# Patient Record
Sex: MALE | Race: WHITE | Employment: OTHER | URBAN - METROPOLITAN AREA
[De-identification: names, ages, dates, MRNs, and addresses within clinical notes are randomized per-mention and may not be internally consistent; named-entity substitution may affect disease eponyms.]

---

## 2017-04-19 RX ORDER — POTASSIUM CHLORIDE 20 MEQ/1
20 TABLET, EXTENDED RELEASE ORAL DAILY
Qty: 30 TABLET | Refills: 6 | Status: SHIPPED | OUTPATIENT
Start: 2017-04-19

## 2024-02-25 ENCOUNTER — HOSPITAL ENCOUNTER (EMERGENCY)
Age: 69
Discharge: HOME | End: 2024-02-25
Attending: EMERGENCY MEDICINE
Payer: COMMERCIAL

## 2024-02-25 ENCOUNTER — APPOINTMENT (OUTPATIENT)
Dept: CT IMAGING | Age: 69
End: 2024-02-25
Payer: COMMERCIAL

## 2024-02-25 VITALS
BODY MASS INDEX: 29.73 KG/M2 | HEIGHT: 66 IN | OXYGEN SATURATION: 99 % | RESPIRATION RATE: 17 BRPM | SYSTOLIC BLOOD PRESSURE: 138 MMHG | HEART RATE: 66 BPM | DIASTOLIC BLOOD PRESSURE: 71 MMHG | WEIGHT: 185 LBS

## 2024-02-25 DIAGNOSIS — G44.039 NONINTRACTABLE PAROXYSMAL HEMICRANIA, UNSPECIFIED CHRONICITY PATTERN: Primary | ICD-10-CM

## 2024-02-25 PROCEDURE — 99284 EMERGENCY DEPT VISIT MOD MDM: CPT | Performed by: EMERGENCY MEDICINE

## 2024-02-25 PROCEDURE — 70450 CT HEAD/BRAIN W/O DYE: CPT

## 2024-02-25 RX ORDER — LOSARTAN POTASSIUM 50 MG/1
50 TABLET ORAL DAILY
COMMUNITY
Start: 2017-05-15

## 2024-02-25 RX ORDER — SPIRONOLACTONE 100 MG/1
100 TABLET, FILM COATED ORAL DAILY
COMMUNITY
Start: 2023-10-16

## 2024-02-25 RX ORDER — EZETIMIBE 10 MG/1
10 TABLET ORAL DAILY
COMMUNITY

## 2024-02-25 RX ORDER — OMEPRAZOLE 20 MG/1
20 TABLET, DELAYED RELEASE ORAL
COMMUNITY
Start: 2022-09-07 | End: 2024-02-25

## 2024-02-25 RX ORDER — CLINDAMYCIN HYDROCHLORIDE 300 MG/1
300 CAPSULE ORAL 3 TIMES DAILY
Qty: 21 CAPSULE | Refills: 0 | Status: SHIPPED | OUTPATIENT
Start: 2024-02-25 | End: 2024-03-03

## 2024-02-25 ASSESSMENT — PAIN - FUNCTIONAL ASSESSMENT: PAIN_FUNCTIONAL_ASSESSMENT: 0-10

## 2024-02-25 NOTE — DISCHARGE INSTRUCTIONS
return to the ER immediately.     Tell us how we did during your visit at [email or web address]   and let us know about your experience

## 2024-02-25 NOTE — ED PROVIDER NOTES
acute abnormality.    SOFT TISSUES/SKULL:  No acute abnormality of the visualized skull or soft  tissues.                    EMERGENCY DEPARTMENT COURSE:   Vitals:    Vitals:    02/25/24 0930   BP: 138/71   Pulse: 66   Resp: 17   SpO2: 99%   Weight: 83.9 kg (185 lb)   Height: 1.676 m (5' 6\")     -------------------------  BP: 138/71,  , Pulse: 66, Respirations: 17      Re-evaluation Notes    The patient is advised to see his PCP and dentist to follow-up.  He is discharged in good condition.    FINAL IMPRESSION      1. Nonintractable paroxysmal hemicrania, unspecified chronicity pattern          DISPOSITION/PLAN   DISPOSITION Decision To Discharge 02/25/2024 10:02:43 AM      Condition on Disposition    good    PATIENT REFERRED TO:  Charo Galvan APRN - CNP  [address] [address]  [phone]    In 3 days        DISCHARGE MEDICATIONS:  New Prescriptions    CLINDAMYCIN (CLEOCIN) 300 MG CAPSULE    Take 1 capsule by mouth 3 times daily for 7 days       (Please note that portions of this note were completed with a voice recognition program.  Efforts were made to edit the dictations but occasionally words are mis-transcribed.)    TARIK LAZAR MD,, MD   Attending Emergency Physician         Tarik Lazar MD  02/25/24 1007

## 2024-07-08 ENCOUNTER — HOSPITAL ENCOUNTER (OUTPATIENT)
Age: 69
Setting detail: OBSERVATION
Discharge: HOME | End: 2024-07-09
Attending: EMERGENCY MEDICINE | Admitting: FAMILY MEDICINE
Payer: MEDICARE

## 2024-07-08 ENCOUNTER — APPOINTMENT (OUTPATIENT)
Dept: GENERAL RADIOLOGY | Age: 69
End: 2024-07-08
Payer: MEDICARE

## 2024-07-08 ENCOUNTER — APPOINTMENT (OUTPATIENT)
Dept: CT IMAGING | Age: 69
End: 2024-07-08
Payer: MEDICARE

## 2024-07-08 DIAGNOSIS — R00.1 BRADYCARDIA: ICD-10-CM

## 2024-07-08 DIAGNOSIS — R55 VASOVAGAL SYNCOPE: ICD-10-CM

## 2024-07-08 DIAGNOSIS — R55 NEAR SYNCOPE: Primary | ICD-10-CM

## 2024-07-08 DIAGNOSIS — R06.02 SHORTNESS OF BREATH: ICD-10-CM

## 2024-07-08 PROBLEM — E03.9 HYPOTHYROIDISM: Status: ACTIVE | Noted: 2024-07-08

## 2024-07-08 PROBLEM — I11.0 HYPERTENSIVE HEART DISEASE WITH CHRONIC DIASTOLIC CONGESTIVE HEART FAILURE (HCC): Status: ACTIVE | Noted: 2024-07-08

## 2024-07-08 PROBLEM — I50.32 HYPERTENSIVE HEART DISEASE WITH CHRONIC DIASTOLIC CONGESTIVE HEART FAILURE (HCC): Status: ACTIVE | Noted: 2024-07-08

## 2024-07-08 PROBLEM — G25.81 RESTLESS LEG SYNDROME: Status: ACTIVE | Noted: 2024-07-08

## 2024-07-08 LAB
ALBUMIN SERPL-MCNC: 4.3 G/DL (ref 3.5–5.2)
ALBUMIN/GLOB SERPL: 1.5 {RATIO} (ref 1–2.5)
ALP SERPL-CCNC: 56 U/L (ref 40–129)
ALT SERPL-CCNC: 31 U/L (ref 5–41)
ANION GAP SERPL CALCULATED.3IONS-SCNC: 10 MMOL/L (ref 9–17)
AST SERPL-CCNC: 31 U/L
BASOPHILS # BLD: 0 K/UL (ref 0–0.2)
BASOPHILS NFR BLD: 1 % (ref 0–2)
BILIRUB SERPL-MCNC: 0.5 MG/DL (ref 0.3–1.2)
BUN SERPL-MCNC: 14 MG/DL (ref 8–23)
CALCIUM SERPL-MCNC: 9.7 MG/DL (ref 8.6–10.4)
CHLORIDE SERPL-SCNC: 105 MMOL/L (ref 98–107)
CO2 SERPL-SCNC: 23 MMOL/L (ref 20–31)
CREAT SERPL-MCNC: 0.8 MG/DL (ref 0.7–1.2)
EOSINOPHIL # BLD: 0.3 K/UL (ref 0–0.4)
EOSINOPHILS RELATIVE PERCENT: 4 % (ref 1–4)
ERYTHROCYTE [DISTWIDTH] IN BLOOD BY AUTOMATED COUNT: 14 % (ref 12.5–15.4)
GFR, ESTIMATED: >90 ML/MIN/1.73M2
GLUCOSE SERPL-MCNC: 94 MG/DL (ref 70–99)
HCT VFR BLD AUTO: 43.7 % (ref 41–53)
HGB BLD-MCNC: 15.1 G/DL (ref 13.5–17.5)
INR PPP: 1
LYMPHOCYTES NFR BLD: 1.7 K/UL (ref 1–4.8)
LYMPHOCYTES RELATIVE PERCENT: 24 % (ref 24–44)
MCH RBC QN AUTO: 32 PG (ref 26–34)
MCHC RBC AUTO-ENTMCNC: 34.6 G/DL (ref 31–37)
MCV RBC AUTO: 92.3 FL (ref 80–100)
MONOCYTES NFR BLD: 0.5 K/UL (ref 0.1–1.2)
MONOCYTES NFR BLD: 7 % (ref 2–11)
NEUTROPHILS NFR BLD: 64 % (ref 36–66)
NEUTS SEG NFR BLD: 4.7 K/UL (ref 1.8–7.7)
PARTIAL THROMBOPLASTIN TIME: 26.1 SEC (ref 21.3–31.3)
PLATELET # BLD AUTO: 189 K/UL (ref 140–450)
PMV BLD AUTO: 8.9 FL (ref 6–12)
POTASSIUM SERPL-SCNC: 4.5 MMOL/L (ref 3.7–5.3)
PROT SERPL-MCNC: 7.2 G/DL (ref 6.4–8.3)
PROTHROMBIN TIME: 10.5 SEC (ref 9.4–12.6)
RBC # BLD AUTO: 4.73 M/UL (ref 4.5–5.9)
SODIUM SERPL-SCNC: 138 MMOL/L (ref 135–144)
TROPONIN I SERPL HS-MCNC: 11 NG/L (ref 0–22)
WBC OTHER # BLD: 7.2 K/UL (ref 3.5–11)

## 2024-07-08 PROCEDURE — 2580000003 HC RX 258

## 2024-07-08 PROCEDURE — 85610 PROTHROMBIN TIME: CPT

## 2024-07-08 PROCEDURE — 6370000000 HC RX 637 (ALT 250 FOR IP)

## 2024-07-08 PROCEDURE — 96360 HYDRATION IV INFUSION INIT: CPT

## 2024-07-08 PROCEDURE — 2580000003 HC RX 258: Performed by: EMERGENCY MEDICINE

## 2024-07-08 PROCEDURE — G0378 HOSPITAL OBSERVATION PER HR: HCPCS

## 2024-07-08 PROCEDURE — 80053 COMPREHEN METABOLIC PANEL: CPT

## 2024-07-08 PROCEDURE — 84484 ASSAY OF TROPONIN QUANT: CPT

## 2024-07-08 PROCEDURE — 99285 EMERGENCY DEPT VISIT HI MDM: CPT

## 2024-07-08 PROCEDURE — 85025 COMPLETE CBC W/AUTO DIFF WBC: CPT

## 2024-07-08 PROCEDURE — 70450 CT HEAD/BRAIN W/O DYE: CPT

## 2024-07-08 PROCEDURE — 93005 ELECTROCARDIOGRAM TRACING: CPT | Performed by: EMERGENCY MEDICINE

## 2024-07-08 PROCEDURE — 85730 THROMBOPLASTIN TIME PARTIAL: CPT

## 2024-07-08 PROCEDURE — 71045 X-RAY EXAM CHEST 1 VIEW: CPT

## 2024-07-08 RX ORDER — 0.9 % SODIUM CHLORIDE 0.9 %
1000 INTRAVENOUS SOLUTION INTRAVENOUS ONCE
Status: COMPLETED | OUTPATIENT
Start: 2024-07-08 | End: 2024-07-08

## 2024-07-08 RX ORDER — POTASSIUM CHLORIDE 7.45 MG/ML
10 INJECTION INTRAVENOUS PRN
Status: DISCONTINUED | OUTPATIENT
Start: 2024-07-08 | End: 2024-07-09 | Stop reason: HOSPADM

## 2024-07-08 RX ORDER — EZETIMIBE 10 MG/1
10 TABLET ORAL DAILY
Status: DISCONTINUED | OUTPATIENT
Start: 2024-07-09 | End: 2024-07-09 | Stop reason: HOSPADM

## 2024-07-08 RX ORDER — SPIRONOLACTONE 100 MG/1
100 TABLET, FILM COATED ORAL DAILY
Status: DISCONTINUED | OUTPATIENT
Start: 2024-07-09 | End: 2024-07-09

## 2024-07-08 RX ORDER — LEVOTHYROXINE SODIUM 0.07 MG/1
150 TABLET ORAL DAILY
Status: DISCONTINUED | OUTPATIENT
Start: 2024-07-09 | End: 2024-07-09 | Stop reason: HOSPADM

## 2024-07-08 RX ORDER — ACETAMINOPHEN 325 MG/1
650 TABLET ORAL EVERY 6 HOURS PRN
Status: DISCONTINUED | OUTPATIENT
Start: 2024-07-08 | End: 2024-07-09 | Stop reason: HOSPADM

## 2024-07-08 RX ORDER — ONDANSETRON 2 MG/ML
4 INJECTION INTRAMUSCULAR; INTRAVENOUS EVERY 6 HOURS PRN
Status: DISCONTINUED | OUTPATIENT
Start: 2024-07-08 | End: 2024-07-09 | Stop reason: HOSPADM

## 2024-07-08 RX ORDER — ONDANSETRON 4 MG/1
4 TABLET, ORALLY DISINTEGRATING ORAL EVERY 8 HOURS PRN
Status: DISCONTINUED | OUTPATIENT
Start: 2024-07-08 | End: 2024-07-09 | Stop reason: HOSPADM

## 2024-07-08 RX ORDER — POTASSIUM CHLORIDE 20 MEQ/1
40 TABLET, EXTENDED RELEASE ORAL PRN
Status: DISCONTINUED | OUTPATIENT
Start: 2024-07-08 | End: 2024-07-09 | Stop reason: HOSPADM

## 2024-07-08 RX ORDER — SODIUM CHLORIDE 0.9 % (FLUSH) 0.9 %
5-40 SYRINGE (ML) INJECTION PRN
Status: DISCONTINUED | OUTPATIENT
Start: 2024-07-08 | End: 2024-07-09 | Stop reason: HOSPADM

## 2024-07-08 RX ORDER — SODIUM CHLORIDE 0.9 % (FLUSH) 0.9 %
5-40 SYRINGE (ML) INJECTION EVERY 12 HOURS SCHEDULED
Status: DISCONTINUED | OUTPATIENT
Start: 2024-07-08 | End: 2024-07-09 | Stop reason: HOSPADM

## 2024-07-08 RX ORDER — MAGNESIUM SULFATE IN WATER 40 MG/ML
2000 INJECTION, SOLUTION INTRAVENOUS PRN
Status: DISCONTINUED | OUTPATIENT
Start: 2024-07-08 | End: 2024-07-09 | Stop reason: HOSPADM

## 2024-07-08 RX ORDER — ACETAMINOPHEN 650 MG/1
650 SUPPOSITORY RECTAL EVERY 6 HOURS PRN
Status: DISCONTINUED | OUTPATIENT
Start: 2024-07-08 | End: 2024-07-09 | Stop reason: HOSPADM

## 2024-07-08 RX ORDER — PRAMIPEXOLE DIHYDROCHLORIDE 0.25 MG/1
1 TABLET ORAL NIGHTLY
Status: DISCONTINUED | OUTPATIENT
Start: 2024-07-08 | End: 2024-07-09 | Stop reason: HOSPADM

## 2024-07-08 RX ORDER — SODIUM CHLORIDE 9 MG/ML
INJECTION, SOLUTION INTRAVENOUS PRN
Status: DISCONTINUED | OUTPATIENT
Start: 2024-07-08 | End: 2024-07-09 | Stop reason: HOSPADM

## 2024-07-08 RX ORDER — SODIUM CHLORIDE 0.9 % (FLUSH) 0.9 %
3 SYRINGE (ML) INJECTION EVERY 8 HOURS
Status: DISCONTINUED | OUTPATIENT
Start: 2024-07-08 | End: 2024-07-09 | Stop reason: HOSPADM

## 2024-07-08 RX ORDER — LOSARTAN POTASSIUM 50 MG/1
50 TABLET ORAL DAILY
Status: DISCONTINUED | OUTPATIENT
Start: 2024-07-09 | End: 2024-07-09 | Stop reason: HOSPADM

## 2024-07-08 RX ORDER — POLYETHYLENE GLYCOL 3350 17 G/17G
17 POWDER, FOR SOLUTION ORAL DAILY PRN
Status: DISCONTINUED | OUTPATIENT
Start: 2024-07-08 | End: 2024-07-09 | Stop reason: HOSPADM

## 2024-07-08 RX ORDER — PRAMIPEXOLE DIHYDROCHLORIDE 0.25 MG/1
1 TABLET ORAL NIGHTLY
Status: DISCONTINUED | OUTPATIENT
Start: 2024-07-09 | End: 2024-07-08

## 2024-07-08 RX ADMIN — SODIUM CHLORIDE, PRESERVATIVE FREE 10 ML: 5 INJECTION INTRAVENOUS at 20:29

## 2024-07-08 RX ADMIN — SODIUM CHLORIDE 1000 ML: 9 INJECTION, SOLUTION INTRAVENOUS at 11:23

## 2024-07-08 RX ADMIN — PRAMIPEXOLE DIHYDROCHLORIDE 1 MG: 0.25 TABLET ORAL at 20:27

## 2024-07-08 ASSESSMENT — ENCOUNTER SYMPTOMS
SORE THROAT: 0
VOMITING: 0
COUGH: 0
CONSTIPATION: 0
SHORTNESS OF BREATH: 0
EYE DISCHARGE: 0
BACK PAIN: 0
COLOR CHANGE: 0
NAUSEA: 1
DIARRHEA: 0

## 2024-07-08 ASSESSMENT — PAIN - FUNCTIONAL ASSESSMENT: PAIN_FUNCTIONAL_ASSESSMENT: NONE - DENIES PAIN

## 2024-07-08 NOTE — H&P
[facility]  Inpatient Service     HISTORY AND PHYSICAL EXAMINATION            Date:   7/8/2024  Patient name:  Fransisco Huddleston  Date of admission:  7/8/2024 10:29 AM  MRN:   [id number]  Account:  [id number]  YOB: 1955  PCP:    Ritesh Brush MD  Room:   [address] [place] [address]  Code Status:    Full Code  hPOA Name:   Fanny Huddleston (spouse); [phone]    History Obtained From:     patient, spouse    History of Present Illness:     Fransisco Huddleston is a 68 y.o. male with PMHx of Afib, aortic regurgitation, mitral regurgitation, aortic valve prosthetic valve replacement( Aug 2015), and mitral valve surgery (Aug 2015) who presents with Dizziness (X2 weeks)  and is admitted to the hospital for the management of Syncope and collapse.      Patient has feelings of fainting and dizziness(lightheadedness) began about 2.5 weeks ago. About 2.5 weeks ago, he was eating something that got stuck in his throat and he experienced a  syncopal episode. Also, he felt like fainting when bearing down to have a bowel movement. He also endorsed feeling lightheaded when he gets up from bed and when he bends down (dizziness is worse when he bends down and looks down than when he looks down and looks straight ahead). Since then, he has avoided those triggers. He endorsed using the stair railing and walking imbalance \"like he is slightly drunk\". Recently, he came back from a 10 day trip from [place] on Friday. This morning the feelings of fainting were worse when he was walking with in the gym around with a friend around 8:20 am in the morning.  Thus, he was brought to the emergency room by his wife. He endorsed nausea and poor asleep (when he doesn't take pramipexole).      He denied palpitations, chest pain, pleuritic chest pain, orthopnea, decreased fluid/food intake, seizures, and recent hospitalizations.  He sees cardiologist Dr. nEnis.    In the ED, they ordered an initial EKG

## 2024-07-08 NOTE — ED PROVIDER NOTES
Medications    ASPIRIN 81 MG TABLET    Take 1 tablet by mouth daily    EZETIMIBE (ZETIA) 10 MG TABLET    Take 1 tablet by mouth daily    FUROSEMIDE (LASIX) 40 MG TABLET    Take 1 tablet by mouth daily    LEVOTHYROXINE (SYNTHROID) 150 MCG TABLET    Take 1 tablet by mouth Daily    LOSARTAN (COZAAR) 50 MG TABLET    Take 1 tablet by mouth daily    METOPROLOL TARTRATE (LOPRESSOR) 25 MG TABLET    Take 1 tablet by mouth 2 times daily Take 0.5 tabs bid    PRAMIPEXOLE (MIRAPEX) 1 MG TABLET    Take 1 tablet by mouth daily For restless leg syndrome    SPIRONOLACTONE (ALDACTONE) 100 MG TABLET    Take 1 tablet by mouth daily       ALLERGIES     is allergic to penicillins.    FAMILY HISTORY     He indicated that his mother is . He indicated that his father is alive. He indicated that only one of his two sisters is alive. He indicated that only one of his two brothers is alive.     family history includes Other in his brother, father, and mother; Thyroid Disease in his sister.    SOCIAL HISTORY      reports that he has been smoking pipe and cigars. He has never used smokeless tobacco. He reports current alcohol use of about 2.0 standard drinks of alcohol per week. He reports that he does not use drugs.    PHYSICAL EXAM     INITIAL VITALS:  height is 1.702 m (5' 7\") and weight is 85.3 kg (188 lb). His blood pressure is 125/75 and his pulse is 55. His respiration is 15 and oxygen saturation is 99%.   Physical Exam  Vitals reviewed.   Constitutional:       Appearance: He is well-developed.   HENT:      Head: Normocephalic and atraumatic.   Eyes:      Conjunctiva/sclera: Conjunctivae normal.      Pupils: Pupils are equal, round, and reactive to light.   Neck:      Trachea: No tracheal deviation.   Cardiovascular:      Rate and Rhythm: Normal rate and regular rhythm.   Pulmonary:      Effort: Pulmonary effort is normal.      Breath sounds: Normal breath sounds.   Abdominal:      General: Bowel sounds are normal. There is no

## 2024-07-09 ENCOUNTER — APPOINTMENT (OUTPATIENT)
Age: 69
End: 2024-07-09
Payer: MEDICARE

## 2024-07-09 VITALS
DIASTOLIC BLOOD PRESSURE: 80 MMHG | SYSTOLIC BLOOD PRESSURE: 105 MMHG | HEIGHT: 67 IN | TEMPERATURE: 97.7 F | WEIGHT: 188 LBS | OXYGEN SATURATION: 96 % | HEART RATE: 69 BPM | RESPIRATION RATE: 17 BRPM | BODY MASS INDEX: 29.51 KG/M2

## 2024-07-09 PROBLEM — Z95.2 STATUS POST AORTIC VALVE REPLACEMENT: Status: ACTIVE | Noted: 2024-07-09

## 2024-07-09 PROBLEM — Z98.890 S/P MVR (MITRAL VALVE REPAIR): Status: ACTIVE | Noted: 2024-07-09

## 2024-07-09 LAB
ANION GAP SERPL CALCULATED.3IONS-SCNC: 9 MMOL/L (ref 9–17)
BASOPHILS # BLD: 0.1 K/UL (ref 0–0.2)
BASOPHILS NFR BLD: 1 % (ref 0–2)
BUN SERPL-MCNC: 13 MG/DL (ref 8–23)
CALCIUM SERPL-MCNC: 9.3 MG/DL (ref 8.6–10.4)
CHLORIDE SERPL-SCNC: 105 MMOL/L (ref 98–107)
CO2 SERPL-SCNC: 25 MMOL/L (ref 20–31)
CREAT SERPL-MCNC: 0.8 MG/DL (ref 0.7–1.2)
ECHO AV AREA PEAK VELOCITY: 1.2 CM2
ECHO AV AREA VTI: 1.2 CM2
ECHO AV AREA/BSA PEAK VELOCITY: 0.6 CM2/M2
ECHO AV AREA/BSA VTI: 0.6 CM2/M2
ECHO AV MEAN GRADIENT: 23 MMHG
ECHO AV MEAN VELOCITY: 2.3 M/S
ECHO AV PEAK GRADIENT: 41 MMHG
ECHO AV PEAK VELOCITY: 3.2 M/S
ECHO AV VELOCITY RATIO: 0.34
ECHO AV VTI: 73.9 CM
ECHO BSA: 2.01 M2
ECHO LV FRACTIONAL SHORTENING: 37 % (ref 28–44)
ECHO LV INTERNAL DIMENSION DIASTOLE INDEX: 2.49 CM/M2
ECHO LV INTERNAL DIMENSION DIASTOLIC: 4.9 CM (ref 4.2–5.9)
ECHO LV INTERNAL DIMENSION SYSTOLIC INDEX: 1.57 CM/M2
ECHO LV INTERNAL DIMENSION SYSTOLIC: 3.1 CM
ECHO LV IVSD: 1 CM (ref 0.6–1)
ECHO LV MASS 2D: 176 G (ref 88–224)
ECHO LV MASS INDEX 2D: 89.4 G/M2 (ref 49–115)
ECHO LV POSTERIOR WALL DIASTOLIC: 1 CM (ref 0.6–1)
ECHO LV RELATIVE WALL THICKNESS RATIO: 0.41
ECHO LVOT AREA: 3.5 CM2
ECHO LVOT AV VTI INDEX: 0.35
ECHO LVOT DIAM: 2.1 CM
ECHO LVOT MEAN GRADIENT: 3 MMHG
ECHO LVOT PEAK GRADIENT: 4 MMHG
ECHO LVOT PEAK VELOCITY: 1.1 M/S
ECHO LVOT STROKE VOLUME INDEX: 45.2 ML/M2
ECHO LVOT SV: 89 ML
ECHO LVOT VTI: 25.7 CM
ECHO MV AREA VTI: 1.7 CM2
ECHO MV LVOT VTI INDEX: 1.98
ECHO MV MAX VELOCITY: 1.7 M/S
ECHO MV MEAN GRADIENT: 7 MMHG
ECHO MV MEAN VELOCITY: 1.2 M/S
ECHO MV PEAK GRADIENT: 12 MMHG
ECHO MV VTI: 51 CM
EKG ATRIAL RATE: 54 BPM
EKG P AXIS: 17 DEGREES
EKG P-R INTERVAL: 320 MS
EKG Q-T INTERVAL: 432 MS
EKG QRS DURATION: 94 MS
EKG QTC CALCULATION (BAZETT): 409 MS
EKG R AXIS: -16 DEGREES
EKG T AXIS: 33 DEGREES
EKG VENTRICULAR RATE: 54 BPM
EOSINOPHIL # BLD: 0.3 K/UL (ref 0–0.4)
EOSINOPHILS RELATIVE PERCENT: 4 % (ref 1–4)
ERYTHROCYTE [DISTWIDTH] IN BLOOD BY AUTOMATED COUNT: 14 % (ref 12.5–15.4)
GFR, ESTIMATED: >90 ML/MIN/1.73M2
GLUCOSE SERPL-MCNC: 92 MG/DL (ref 70–99)
HCT VFR BLD AUTO: 42.5 % (ref 41–53)
HGB BLD-MCNC: 14.9 G/DL (ref 13.5–17.5)
LYMPHOCYTES NFR BLD: 1.9 K/UL (ref 1–4.8)
LYMPHOCYTES RELATIVE PERCENT: 25 % (ref 24–44)
MCH RBC QN AUTO: 32.3 PG (ref 26–34)
MCHC RBC AUTO-ENTMCNC: 35 G/DL (ref 31–37)
MCV RBC AUTO: 92.1 FL (ref 80–100)
MONOCYTES NFR BLD: 0.5 K/UL (ref 0.1–1.2)
MONOCYTES NFR BLD: 6 % (ref 2–11)
NEUTROPHILS NFR BLD: 64 % (ref 36–66)
NEUTS SEG NFR BLD: 4.8 K/UL (ref 1.8–7.7)
PLATELET # BLD AUTO: 197 K/UL (ref 140–450)
PMV BLD AUTO: 9.1 FL (ref 6–12)
POTASSIUM SERPL-SCNC: 4.4 MMOL/L (ref 3.7–5.3)
RBC # BLD AUTO: 4.61 M/UL (ref 4.5–5.9)
SODIUM SERPL-SCNC: 139 MMOL/L (ref 135–144)
WBC OTHER # BLD: 7.4 K/UL (ref 3.5–11)

## 2024-07-09 PROCEDURE — 6370000000 HC RX 637 (ALT 250 FOR IP)

## 2024-07-09 PROCEDURE — G0378 HOSPITAL OBSERVATION PER HR: HCPCS

## 2024-07-09 PROCEDURE — 99223 1ST HOSP IP/OBS HIGH 75: CPT | Performed by: NURSE PRACTITIONER

## 2024-07-09 PROCEDURE — 93308 TTE F-UP OR LMTD: CPT | Performed by: INTERNAL MEDICINE

## 2024-07-09 PROCEDURE — 93325 DOPPLER ECHO COLOR FLOW MAPG: CPT | Performed by: INTERNAL MEDICINE

## 2024-07-09 PROCEDURE — 99222 1ST HOSP IP/OBS MODERATE 55: CPT | Performed by: FAMILY MEDICINE

## 2024-07-09 PROCEDURE — 2580000003 HC RX 258

## 2024-07-09 PROCEDURE — 85025 COMPLETE CBC W/AUTO DIFF WBC: CPT

## 2024-07-09 PROCEDURE — 99238 HOSP IP/OBS DSCHRG MGMT 30/<: CPT | Performed by: FAMILY MEDICINE

## 2024-07-09 PROCEDURE — 6370000000 HC RX 637 (ALT 250 FOR IP): Performed by: NURSE PRACTITIONER

## 2024-07-09 PROCEDURE — 80048 BASIC METABOLIC PNL TOTAL CA: CPT

## 2024-07-09 PROCEDURE — 36415 COLL VENOUS BLD VENIPUNCTURE: CPT

## 2024-07-09 PROCEDURE — 93321 DOPPLER ECHO F-UP/LMTD STD: CPT | Performed by: INTERNAL MEDICINE

## 2024-07-09 PROCEDURE — 93308 TTE F-UP OR LMTD: CPT

## 2024-07-09 RX ORDER — SPIRONOLACTONE 25 MG/1
25 TABLET ORAL DAILY
Status: DISCONTINUED | OUTPATIENT
Start: 2024-07-09 | End: 2024-07-09 | Stop reason: HOSPADM

## 2024-07-09 RX ORDER — LOSARTAN POTASSIUM 50 MG/1
25 TABLET ORAL DAILY
Qty: 30 TABLET | Refills: 0 | Status: SHIPPED | OUTPATIENT
Start: 2024-07-09

## 2024-07-09 RX ORDER — SPIRONOLACTONE 25 MG/1
25 TABLET ORAL DAILY
Qty: 30 TABLET | Refills: 0 | Status: SHIPPED | OUTPATIENT
Start: 2024-07-09

## 2024-07-09 RX ORDER — SPIRONOLACTONE 25 MG/1
25 TABLET ORAL DAILY
Qty: 30 TABLET | Refills: 3 | Status: CANCELLED | OUTPATIENT
Start: 2024-07-10

## 2024-07-09 RX ADMIN — SODIUM CHLORIDE, PRESERVATIVE FREE 10 ML: 5 INJECTION INTRAVENOUS at 10:57

## 2024-07-09 RX ADMIN — LOSARTAN POTASSIUM 50 MG: 50 TABLET, FILM COATED ORAL at 09:28

## 2024-07-09 RX ADMIN — EZETIMIBE 10 MG: 10 TABLET ORAL at 09:28

## 2024-07-09 RX ADMIN — LEVOTHYROXINE SODIUM 150 MCG: 75 TABLET ORAL at 06:59

## 2024-07-09 RX ADMIN — SPIRONOLACTONE 25 MG: 25 TABLET, FILM COATED ORAL at 09:28

## 2024-07-09 NOTE — PLAN OF CARE
Prelim ECHO reviewed with preserved EF and stable valvular dx.   Negative orthostatics.     Ok to d/c home off BB.    Plan for outpt follow up in clinic and will consider holter then

## 2024-07-09 NOTE — DISCHARGE INSTRUCTIONS
Date of admission: 7/8/2024  Date of discharge: 07/09/24    Your care was provided by the attending and resident physicians of the [facility] Residency Program. The primary encounter diagnosis was Near syncope. Diagnoses of Bradycardia, Shortness of breath, and Vasovagal syncope were also pertinent to this visit.    FOLLOW-UP  - Follow up with your primary care physician Ritesh Brush in 2 days.  - Follow up with your cardiology in 1  weeks.    MEDICATIONS  -   We have decreased your spironolactone dose. Please continue taking your spironolactone 25 mg tablet until you see your primary care doctor.  - Continue taking your other home medications as directed.    ADDITIONAL INSTRUCTIONS  - you will need to follow up with cardiology for outpatient Holter monitoring or loop recorder  - Please return immediately to the [facility] Emergency Department if you experience fainting, palpitations, chest pain, shortness of breath and any other concerning symptoms.

## 2024-07-09 NOTE — PLAN OF CARE
Problem: Discharge Planning  Goal: Discharge to home or other facility with appropriate resources  Outcome: Progressing  Flowsheets (Taken 7/8/2024 2000)  Discharge to home or other facility with appropriate resources:   Identify barriers to discharge with patient and caregiver   Arrange for needed discharge resources and transportation as appropriate   Refer to discharge planning if patient needs post-hospital services based on physician order or complex needs related to functional status, cognitive ability or social support system     Problem: ABCDS Injury Assessment  Goal: Absence of physical injury  Outcome: Progressing

## 2024-07-09 NOTE — PLAN OF CARE
Problem: Discharge Planning  Goal: Discharge to home or other facility with appropriate resources  7/9/2024 1656 by Amparo Ford, RN  Outcome: Progressing  Flowsheets (Taken 7/9/2024 0830)  Discharge to home or other facility with appropriate resources:   Identify barriers to discharge with patient and caregiver   Identify discharge learning needs (meds, wound care, etc)     Problem: ABCDS Injury Assessment  Goal: Absence of physical injury  7/9/2024 1656 by Amparo Ford, RN  Outcome: Progressing

## 2024-07-09 NOTE — PROGRESS NOTES
Pt discharged via wheelchair. Discharge instructions reviewed with pt and wife. No questions at this time. Pt AMANDA [id number]  
future visits requested       
auscultation  Pulmonary:      Effort: Pulmonary effort is normal. No tachypnea, bradypnea or accessory muscle usage.      Breath sounds: Normal breath sounds. No wheezing or rales.   Skin:     General: Skin is warm.      Coloration: Skin is not ashen, cyanotic or mottled.      Nails: There is no clubbing.      Comments: No rash or wounds upon inspection of the foot bilaterally  Hair noted in both feet upon inspection  Trace pitting edema in R leg and 0.5 pitting edema in the L leg noted     Neurological:      Mental Status: He is alert.            Assessment:     Hospital Problems             Last Modified POA    * (Principal) Syncope and collapse 7/8/2024 Yes    Moderate aortic regurgitation 7/8/2024 Yes    Severe mitral regurgitation by prior echocardiogram 7/8/2024 Yes    Moderate to severe pulmonary hypertension (HCC) 7/8/2024 Yes    Current tobacco use 7/8/2024 Yes    Paroxysmal A-fib (HCC) 7/8/2024 Yes    Restless leg syndrome 7/8/2024 Yes    Hypertensive heart disease with chronic diastolic congestive heart failure (HCC) 7/8/2024 Yes    Hypothyroidism 7/8/2024 Yes       Plan:   Syncope x 1  paroxysmal Afib  Hx of Aortic Regurgitation and Mitral Regurgitation  Hx of Aortic Valve Replacement and Mitral Valve Surgery (Aug 2015)  Cardio consulted; recommendations were appreciated  Echo pending; cardio recommended discharge with outpatient follow up if echo is stable  Repeat orthostatics was negative  Avoid AV blocking agents, per cardio  Will decrease losartan to 25 mg daily if blood pressure is low, per cardio  Most likely vasovagal; positive hx of lightheadedness after eating food and baring down to have a bowel movement  Could be due to bradycardia with or without arrhythmia; cardio recommended holter monitor assessment in outpatient setting  Cardiac Telemetry: Nonsustained VT at 8:44pm last night; Bradycardia with HR 47-59 this AM  Hx of metoprolol 25 mg; continue to hold because of bradycardia

## 2024-07-09 NOTE — CONSULTS
07/08/24  1127   PROTIME 10.5   INR 1.0     APTT:  Recent Labs     07/08/24  1127   APTT 26.1     CARDIAC ENZYMES:No results for input(s): \"CKTOTAL\", \"CKMB\", \"CKMBINDEX\", \"TROPONINI\" in the last 72 hours.  FASTING LIPID PANEL:No results found for: \"HDL\", \"LDLDIRECT\", \"TRIG\"  LIVER PROFILE:  Recent Labs     07/08/24  1043   AST 31   ALT 31       IMPRESSION:    Patient Active Problem List   Diagnosis    Moderate aortic regurgitation    Severe mitral regurgitation by prior echocardiogram    Moderate to severe pulmonary hypertension (HCC)    Atrial dilation    Pericardial effusion    Current tobacco use    Paroxysmal A-fib (HCC)    Syncope and collapse    Restless leg syndrome    Hypertensive heart disease with chronic diastolic congestive heart failure (HCC)    Hypothyroidism       RECOMMENDATIONS:  Near syncope. Negative orthostatics. Recheck today.  Likely secondary to bradycardia. Discussed hydration   Bradycardia.  Off BB. Avoid AV blocking agents   HTN. Soft will monitor.  Will decrease aldactone from 100mg to 25mg daily.  Continue losartan for now, if BP remains lower, will decrease to 25mg daily   Check ECHO   If ECHO stable, likely no further inpt workup.  Will plan for outpt holter monitor.        Discussed with patient and Nurse.  Cassie Gonzalez, CNP  [facility]           [phone]

## 2024-07-10 NOTE — DISCHARGE SUMMARY
[facility]  Inpatient Service    Discharge Summary     Patient ID: Fransisco Huddleston  :  1955   MRN: [id number]     ACCOUNT:  [id number]   Patient's PCP: Ritesh Brush MD  Admit Date: 2024   Discharge Date: 2024  Length of Stay: 1  Code Status:  Prior  Admitting Physician: Mina Bermeo MD  Discharge Physician: Mina Bermeo MD    Active Discharge Diagnoses:     Hospital Problem Lists:  Principal Problem:    Near syncope  Active Problems:    Moderate aortic regurgitation    Severe mitral regurgitation by prior echocardiogram    Moderate to severe pulmonary hypertension (HCC)    Current tobacco use    Paroxysmal A-fib (HCC)    Restless leg syndrome    Hypertensive heart disease with chronic diastolic congestive heart failure (HCC)    Hypothyroidism    Status post aortic valve replacement    S/P MVR (mitral valve repair)  Resolved Problems:    * No resolved hospital problems. *      Admission Condition:  good     Discharged Condition: good    Hospital Stay:     Hospital Course:  Fransisco Huddleston is a 68 y.o. male with a PMH of  PMHx of Afib, aortic regurgitation, mitral regurgitation, aortic valve prosthetic valve replacement( Aug 2015), and mitral valve surgery (Aug 2015) who presented to ED with the complaint of dizziness; he was admitted for syncope and collapse. Patient was feeling lightheaded and unstable for a period of 2 weeks with one syncopal event.      In the ED, they ordered an initial EKG which was positive for sinus bradycardia and first degree AV block. CBC and CMP were grossly normal. CXR was positive for mild cardiomegaly. 1L fluid bolus was given. Troponin was 11. PT, APTT, and INR were normal. Negative orthostatics. CT Head was negative for acute intracranial abnormality. Cardio was consulted in the ED and recommended admission for observation.    While hospitalized, metoprolol was held secondary to bradycardia. Losartan and

## 2025-05-29 ENCOUNTER — APPOINTMENT (OUTPATIENT)
Dept: GENERAL RADIOLOGY | Age: 70
DRG: 091 | End: 2025-05-29
Payer: MEDICARE

## 2025-05-29 ENCOUNTER — APPOINTMENT (OUTPATIENT)
Dept: CT IMAGING | Age: 70
DRG: 091 | End: 2025-05-29
Payer: MEDICARE

## 2025-05-29 ENCOUNTER — HOSPITAL ENCOUNTER (EMERGENCY)
Age: 70
Discharge: ANOTHER HOSPITAL | DRG: 091 | End: 2025-05-30
Attending: EMERGENCY MEDICINE
Payer: MEDICARE

## 2025-05-29 DIAGNOSIS — G21.0 NEUROLEPTIC MALIGNANT SYNDROME: Primary | ICD-10-CM

## 2025-05-29 LAB
ALBUMIN SERPL-MCNC: 4.5 G/DL (ref 3.5–5.2)
ALBUMIN/GLOB SERPL: 1.6 {RATIO} (ref 1–2.5)
ALLEN TEST: POSITIVE
ALP SERPL-CCNC: 83 U/L (ref 40–129)
ALT SERPL-CCNC: 69 U/L (ref 10–50)
AMPHET UR QL SCN: NEGATIVE
ANION GAP SERPL CALCULATED.3IONS-SCNC: 12 MMOL/L (ref 9–16)
APAP SERPL-MCNC: <5 UG/ML (ref 10–30)
AST SERPL-CCNC: 83 U/L (ref 10–50)
BARBITURATES UR QL SCN: NEGATIVE
BASOPHILS # BLD: 0 K/UL (ref 0–0.2)
BASOPHILS NFR BLD: 1 % (ref 0–2)
BENZODIAZ UR QL: POSITIVE
BILIRUB DIRECT SERPL-MCNC: 0.2 MG/DL (ref 0–0.2)
BILIRUB INDIRECT SERPL-MCNC: 0.1 MG/DL (ref 0–1)
BILIRUB SERPL-MCNC: 0.3 MG/DL (ref 0–1.2)
BUN SERPL-MCNC: 12 MG/DL (ref 8–23)
CALCIUM SERPL-MCNC: 9.1 MG/DL (ref 8.6–10.4)
CANNABINOIDS UR QL SCN: POSITIVE
CHLORIDE SERPL-SCNC: 105 MMOL/L (ref 98–107)
CK SERPL-CCNC: 1853 U/L (ref 39–308)
CO2 SERPL-SCNC: 24 MMOL/L (ref 20–31)
COCAINE UR QL SCN: NEGATIVE
CREAT SERPL-MCNC: 0.9 MG/DL (ref 0.7–1.2)
EOSINOPHIL # BLD: 0.5 K/UL (ref 0–0.4)
EOSINOPHILS RELATIVE PERCENT: 7 % (ref 1–4)
ERYTHROCYTE [DISTWIDTH] IN BLOOD BY AUTOMATED COUNT: 13.9 % (ref 12.5–15.4)
ETHANOL PERCENT: NORMAL %
ETHANOLAMINE SERPL-MCNC: <10 MG/DL (ref 0–0.08)
FENTANYL UR QL: NEGATIVE
FIO2: 50
GFR, ESTIMATED: >90 ML/MIN/1.73M2
GLUCOSE SERPL-MCNC: 88 MG/DL (ref 74–99)
HCT VFR BLD AUTO: 44.8 % (ref 41–53)
HGB BLD-MCNC: 15 G/DL (ref 13.5–17.5)
LYMPHOCYTES NFR BLD: 2 K/UL (ref 1–4.8)
LYMPHOCYTES RELATIVE PERCENT: 27 % (ref 24–44)
MAGNESIUM SERPL-MCNC: 2.5 MG/DL (ref 1.6–2.4)
MCH RBC QN AUTO: 30.5 PG (ref 26–34)
MCHC RBC AUTO-ENTMCNC: 33.6 G/DL (ref 31–37)
MCV RBC AUTO: 90.9 FL (ref 80–100)
METHADONE UR QL: NEGATIVE
MODE: AC
MONOCYTES NFR BLD: 0.6 K/UL (ref 0.1–1.2)
MONOCYTES NFR BLD: 9 % (ref 2–11)
NEGATIVE BASE EXCESS, ART: 0.1 MMOL/L (ref 0–2)
NEUTROPHILS NFR BLD: 56 % (ref 36–66)
NEUTS SEG NFR BLD: 4.2 K/UL (ref 1.8–7.7)
O2 DELIVERY DEVICE: ABNORMAL
OPIATES UR QL SCN: NEGATIVE
OXYCODONE UR QL SCN: NEGATIVE
PCP UR QL SCN: NEGATIVE
PLATELET # BLD AUTO: 240 K/UL (ref 140–450)
PMV BLD AUTO: 8.7 FL (ref 6–12)
POC HCO3: 25.4 MMOL/L (ref 21–28)
POC O2 SATURATION: 99.3 % (ref 94–98)
POC PCO2: 43.3 MM HG (ref 35–48)
POC PH: 7.38 (ref 7.35–7.45)
POC PO2: 152.1 MM HG (ref 83–108)
POTASSIUM SERPL-SCNC: 4.1 MMOL/L (ref 3.7–5.3)
PROT SERPL-MCNC: 7.3 G/DL (ref 6.6–8.7)
RBC # BLD AUTO: 4.93 M/UL (ref 4.5–5.9)
SALICYLATES SERPL-MCNC: <0.5 MG/DL (ref 0–10)
SAMPLE SITE: ABNORMAL
SODIUM SERPL-SCNC: 141 MMOL/L (ref 136–145)
TROPONIN I SERPL HS-MCNC: 17 NG/L (ref 0–22)
TSH SERPL DL<=0.05 MIU/L-ACNC: 1.48 UIU/ML (ref 0.27–4.2)
WBC OTHER # BLD: 7.3 K/UL (ref 3.5–11)

## 2025-05-29 PROCEDURE — 6360000002 HC RX W HCPCS

## 2025-05-29 PROCEDURE — 84484 ASSAY OF TROPONIN QUANT: CPT

## 2025-05-29 PROCEDURE — 82803 BLOOD GASES ANY COMBINATION: CPT

## 2025-05-29 PROCEDURE — 71045 X-RAY EXAM CHEST 1 VIEW: CPT

## 2025-05-29 PROCEDURE — 70450 CT HEAD/BRAIN W/O DYE: CPT

## 2025-05-29 PROCEDURE — 80143 DRUG ASSAY ACETAMINOPHEN: CPT

## 2025-05-29 PROCEDURE — 0BH17EZ INSERTION OF ENDOTRACHEAL AIRWAY INTO TRACHEA, VIA NATURAL OR ARTIFICIAL OPENING: ICD-10-PCS | Performed by: EMERGENCY MEDICINE

## 2025-05-29 PROCEDURE — 85025 COMPLETE CBC W/AUTO DIFF WBC: CPT

## 2025-05-29 PROCEDURE — 2580000003 HC RX 258: Performed by: EMERGENCY MEDICINE

## 2025-05-29 PROCEDURE — 5A1945Z RESPIRATORY VENTILATION, 24-96 CONSECUTIVE HOURS: ICD-10-PCS | Performed by: INTERNAL MEDICINE

## 2025-05-29 PROCEDURE — 80076 HEPATIC FUNCTION PANEL: CPT

## 2025-05-29 PROCEDURE — 36415 COLL VENOUS BLD VENIPUNCTURE: CPT

## 2025-05-29 PROCEDURE — 80307 DRUG TEST PRSMV CHEM ANLYZR: CPT

## 2025-05-29 PROCEDURE — 6360000002 HC RX W HCPCS: Performed by: NURSE PRACTITIONER

## 2025-05-29 PROCEDURE — 80048 BASIC METABOLIC PNL TOTAL CA: CPT

## 2025-05-29 PROCEDURE — 6360000002 HC RX W HCPCS: Performed by: EMERGENCY MEDICINE

## 2025-05-29 PROCEDURE — 2500000003 HC RX 250 WO HCPCS: Performed by: EMERGENCY MEDICINE

## 2025-05-29 PROCEDURE — 93005 ELECTROCARDIOGRAM TRACING: CPT | Performed by: NURSE PRACTITIONER

## 2025-05-29 PROCEDURE — 84443 ASSAY THYROID STIM HORMONE: CPT

## 2025-05-29 PROCEDURE — 2500000003 HC RX 250 WO HCPCS

## 2025-05-29 PROCEDURE — 80179 DRUG ASSAY SALICYLATE: CPT

## 2025-05-29 PROCEDURE — 82550 ASSAY OF CK (CPK): CPT

## 2025-05-29 PROCEDURE — 36600 WITHDRAWAL OF ARTERIAL BLOOD: CPT

## 2025-05-29 PROCEDURE — 83735 ASSAY OF MAGNESIUM: CPT

## 2025-05-29 PROCEDURE — G0480 DRUG TEST DEF 1-7 CLASSES: HCPCS

## 2025-05-29 RX ORDER — HALOPERIDOL 5 MG/ML
5 INJECTION INTRAMUSCULAR ONCE
Status: COMPLETED | OUTPATIENT
Start: 2025-05-29 | End: 2025-05-29

## 2025-05-29 RX ORDER — ROCURONIUM BROMIDE 10 MG/ML
1 INJECTION, SOLUTION INTRAVENOUS PRN
Status: DISCONTINUED | OUTPATIENT
Start: 2025-05-29 | End: 2025-05-30

## 2025-05-29 RX ORDER — LORAZEPAM 2 MG/ML
2 INJECTION INTRAMUSCULAR ONCE
Status: COMPLETED | OUTPATIENT
Start: 2025-05-29 | End: 2025-05-29

## 2025-05-29 RX ORDER — ROCURONIUM BROMIDE 10 MG/ML
100 INJECTION, SOLUTION INTRAVENOUS ONCE
Status: COMPLETED | OUTPATIENT
Start: 2025-05-29 | End: 2025-05-29

## 2025-05-29 RX ORDER — BROMOCRIPTINE MESYLATE 2.5 MG/1
2.5 TABLET ORAL ONCE
Status: DISCONTINUED | OUTPATIENT
Start: 2025-05-29 | End: 2025-05-30 | Stop reason: HOSPADM

## 2025-05-29 RX ORDER — DIPHENHYDRAMINE HYDROCHLORIDE 50 MG/ML
25 INJECTION, SOLUTION INTRAMUSCULAR; INTRAVENOUS ONCE
Status: COMPLETED | OUTPATIENT
Start: 2025-05-29 | End: 2025-05-29

## 2025-05-29 RX ORDER — DEXMEDETOMIDINE HYDROCHLORIDE 4 UG/ML
INJECTION, SOLUTION INTRAVENOUS
Status: DISCONTINUED
Start: 2025-05-29 | End: 2025-05-30 | Stop reason: HOSPADM

## 2025-05-29 RX ORDER — ROCURONIUM BROMIDE 10 MG/ML
0.6 INJECTION, SOLUTION INTRAVENOUS ONCE
Status: DISCONTINUED | OUTPATIENT
Start: 2025-05-29 | End: 2025-05-29

## 2025-05-29 RX ORDER — LORAZEPAM 2 MG/ML
1 INJECTION INTRAMUSCULAR ONCE
Status: DISCONTINUED | OUTPATIENT
Start: 2025-05-29 | End: 2025-05-29

## 2025-05-29 RX ORDER — LORAZEPAM 1 MG/1
1 TABLET ORAL ONCE
Status: DISCONTINUED | OUTPATIENT
Start: 2025-05-29 | End: 2025-05-29

## 2025-05-29 RX ORDER — ETOMIDATE 2 MG/ML
20 INJECTION INTRAVENOUS ONCE
Status: COMPLETED | OUTPATIENT
Start: 2025-05-29 | End: 2025-05-29

## 2025-05-29 RX ORDER — LORAZEPAM 2 MG/ML
8 INJECTION INTRAMUSCULAR ONCE
Status: COMPLETED | OUTPATIENT
Start: 2025-05-29 | End: 2025-05-29

## 2025-05-29 RX ORDER — LORAZEPAM 2 MG/ML
INJECTION INTRAMUSCULAR
Status: COMPLETED
Start: 2025-05-29 | End: 2025-05-29

## 2025-05-29 RX ORDER — MIDAZOLAM HYDROCHLORIDE 1 MG/ML
1-10 INJECTION, SOLUTION INTRAVENOUS CONTINUOUS
Status: DISCONTINUED | OUTPATIENT
Start: 2025-05-29 | End: 2025-05-30 | Stop reason: HOSPADM

## 2025-05-29 RX ORDER — LORAZEPAM 2 MG/ML
1 INJECTION INTRAMUSCULAR ONCE
Status: COMPLETED | OUTPATIENT
Start: 2025-05-29 | End: 2025-05-29

## 2025-05-29 RX ORDER — SODIUM CHLORIDE 9 MG/ML
INJECTION, SOLUTION INTRAVENOUS CONTINUOUS
Status: DISCONTINUED | OUTPATIENT
Start: 2025-05-29 | End: 2025-05-30 | Stop reason: HOSPADM

## 2025-05-29 RX ORDER — BENZTROPINE MESYLATE 1 MG/ML
1 INJECTION, SOLUTION INTRAMUSCULAR; INTRAVENOUS ONCE
Status: COMPLETED | OUTPATIENT
Start: 2025-05-29 | End: 2025-05-29

## 2025-05-29 RX ORDER — 0.9 % SODIUM CHLORIDE 0.9 %
1000 INTRAVENOUS SOLUTION INTRAVENOUS ONCE
Status: COMPLETED | OUTPATIENT
Start: 2025-05-29 | End: 2025-05-29

## 2025-05-29 RX ORDER — PROPOFOL 10 MG/ML
5-50 INJECTION, EMULSION INTRAVENOUS CONTINUOUS
Status: DISCONTINUED | OUTPATIENT
Start: 2025-05-29 | End: 2025-05-30 | Stop reason: HOSPADM

## 2025-05-29 RX ORDER — DEXMEDETOMIDINE HYDROCHLORIDE 4 UG/ML
.1-1.5 INJECTION, SOLUTION INTRAVENOUS CONTINUOUS
Status: DISCONTINUED | OUTPATIENT
Start: 2025-05-29 | End: 2025-05-30 | Stop reason: HOSPADM

## 2025-05-29 RX ADMIN — ETOMIDATE 20 MG: 2 INJECTION INTRAVENOUS at 22:03

## 2025-05-29 RX ADMIN — SODIUM CHLORIDE 1000 ML: 0.9 INJECTION, SOLUTION INTRAVENOUS at 22:57

## 2025-05-29 RX ADMIN — BENZTROPINE MESYLATE 1 MG: 1 INJECTION INTRAMUSCULAR; INTRAVENOUS at 21:07

## 2025-05-29 RX ADMIN — LORAZEPAM 6 MG: 2 INJECTION INTRAMUSCULAR; INTRAVENOUS at 21:40

## 2025-05-29 RX ADMIN — ROCURONIUM BROMIDE 100 MG: 10 INJECTION, SOLUTION INTRAVENOUS at 22:03

## 2025-05-29 RX ADMIN — PROPOFOL 20 MCG/KG/MIN: 10 INJECTION, EMULSION INTRAVENOUS at 22:14

## 2025-05-29 RX ADMIN — MIDAZOLAM HYDROCHLORIDE 2 MG/HR: 1 INJECTION, SOLUTION INTRAVENOUS at 22:13

## 2025-05-29 RX ADMIN — HALOPERIDOL LACTATE 5 MG: 5 INJECTION, SOLUTION INTRAMUSCULAR at 20:34

## 2025-05-29 RX ADMIN — DEXMEDETOMIDINE HYDROCHLORIDE 0.2 MCG/KG/HR: 400 INJECTION, SOLUTION INTRAVENOUS at 21:26

## 2025-05-29 RX ADMIN — DIPHENHYDRAMINE HYDROCHLORIDE 25 MG: 50 INJECTION INTRAMUSCULAR; INTRAVENOUS at 20:52

## 2025-05-29 RX ADMIN — LORAZEPAM 2 MG: 2 INJECTION INTRAMUSCULAR; INTRAVENOUS at 20:58

## 2025-05-29 RX ADMIN — LORAZEPAM 1 MG: 2 INJECTION INTRAMUSCULAR; INTRAVENOUS at 20:03

## 2025-05-29 RX ADMIN — LORAZEPAM 2 MG: 2 INJECTION INTRAMUSCULAR; INTRAVENOUS at 21:14

## 2025-05-29 RX ADMIN — LORAZEPAM 2 MG: 2 INJECTION INTRAMUSCULAR at 21:14

## 2025-05-29 RX ADMIN — SODIUM CHLORIDE: 0.9 INJECTION, SOLUTION INTRAVENOUS at 23:28

## 2025-05-29 ASSESSMENT — PULMONARY FUNCTION TESTS: PIF_VALUE: 18

## 2025-05-29 ASSESSMENT — PAIN - FUNCTIONAL ASSESSMENT: PAIN_FUNCTIONAL_ASSESSMENT: NONE - DENIES PAIN

## 2025-05-29 ASSESSMENT — LIFESTYLE VARIABLES
HOW OFTEN DO YOU HAVE A DRINK CONTAINING ALCOHOL: MONTHLY OR LESS
HOW MANY STANDARD DRINKS CONTAINING ALCOHOL DO YOU HAVE ON A TYPICAL DAY: 1 OR 2

## 2025-05-29 NOTE — ED PROVIDER NOTES
Bilirubin, Direct 0.2 0.0 - 0.2 mg/dL    Bilirubin, Indirect 0.1 0.0 - 1.0 mg/dL    Total Protein 7.3 6.6 - 8.7 g/dL    Albumin/Globulin Ratio 1.6 1.0 - 2.5   CK   Result Value Ref Range    Total CK 1,853 (H) 39 - 308 U/L   Arterial Blood Gas, POC   Result Value Ref Range    POC pH 7.376 7.350 - 7.450    POC pCO2 43.3 35.0 - 48.0 mm Hg    POC PO2 152.1 (H) 83.0 - 108.0 mm Hg    POC HCO3 25.4 21.0 - 28.0 mmol/L    Negative Base Excess, Art 0.1 0.0 - 2.0 mmol/L    POC O2 SAT 99.3 (H) 94.0 - 98.0 %    O2 Delivery Device Adult Ventilator     Jeancarlos Test POSITIVE     Sample Site Right Radial Artery     Mode AC     FIO2 50.0    EKG 12 Lead   Result Value Ref Range    Ventricular Rate 69 BPM    Atrial Rate 69 BPM    P-R Interval 252 ms    QRS Duration 98 ms    Q-T Interval 392 ms    QTc Calculation (Bazett) 420 ms    P Axis 56 degrees    R Axis -45 degrees    T Axis 77 degrees   EKG 12 Lead   Result Value Ref Range    Ventricular Rate 104 BPM    Atrial Rate 104 BPM    P-R Interval 272 ms    QRS Duration 96 ms    Q-T Interval 338 ms    QTc Calculation (Bazett) 444 ms    P Axis -13 degrees    R Axis -21 degrees    T Axis 76 degrees       MEDICATIONS GIVEN:  The following medications were provided, which may have required intensive monitoring if any were given:   Medications   bromocriptine (PARLODEL) tablet 2.5 mg (2.5 mg Oral Not Given 5/29/25 2230)   dexmedeTOMIDine (PRECEDEX) 400 mcg in sodium chloride 0.9 % 100 mL infusion (0 mcg/kg/hr × 85.3 kg IntraVENous Stopped 5/29/25 2255)   midazolam (VERSED) 100mg/100mL in NS infusion (2 mg/hr IntraVENous New Bag 5/29/25 2213)   propofol infusion (25 mcg/kg/min × 85.3 kg IntraVENous Rate/Dose Change 5/29/25 2356)   0.9 % sodium chloride infusion ( IntraVENous New Bag 5/29/25 2328)   rocuronium (ZEMURON) injection 85 mg (has no administration in time range)   LORazepam (ATIVAN) injection 1 mg (1 mg IntraVENous Given 5/29/25 2003)   haloperidol lactate (HALDOL) injection 5 mg (5 mg  IntraVENous Given 5/29/25 2034)   diphenhydrAMINE (BENADRYL) injection 25 mg (25 mg IntraVENous Given 5/29/25 2052)   benztropine mesylate (COGENTIN) injection 1 mg (1 mg IntraVENous Given 5/29/25 2107)   LORazepam (ATIVAN) injection 2 mg (2 mg IntraVENous Given 5/29/25 2058)   LORazepam (ATIVAN) injection 2 mg (2 mg IntraVENous Given 5/29/25 2114)   LORazepam (ATIVAN) injection 8 mg (6 mg IntraVENous Given 5/29/25 2140)   rocuronium (ZEMURON) injection 100 mg (100 mg IntraVENous Given 5/29/25 2203)   etomidate (AMIDATE) injection 20 mg (20 mg IntraVENous Given 5/29/25 2203)   sodium chloride 0.9 % bolus 1,000 mL (0 mLs IntraVENous Stopped 5/29/25 2321)       RADIOLOGY:  The following imaging tests were ordered, reviewed by me, and interpreted by Radiology if any imaging was done:  CT HEAD WO CONTRAST  Result Date: 5/29/2025  EXAM: CT HEAD WITHOUT 05/29/2025 10:49:02 PM TECHNIQUE: CT of the head was performed without the administration of intravenous contrast. Automated exposure control, iterative reconstruction, and/or weight based adjustment of the mA/kV was utilized to reduce the radiation dose to as low as reasonably achievable. COMPARISON: 07/08/2024 CLINICAL HISTORY: AMS. AMS; TECHNOLOGIST PROVIDED HISTORY: ; AMS; Decision Support Exception - unselect if not a suspected or confirmed emergency medical condition->Emergency Medical Condition (MA); Reason for exam: AMS FINDINGS: BRAIN AND VENTRICLES: There is no acute intracranial hemorrhage, mass effect or midline shift. No abnormal extra-axial fluid collection. The gray-white differentiation is maintained without evidence of an acute infarct. There is no evidence of hydrocephalus. Mild generalized cortical atrophy. Mild patchy white matter low attenuation. No basilar cistern or sulcal effacement. ORBITS: The visualized portion of the orbits demonstrate no acute abnormality. SINUSES: The visualized paranasal sinuses and mastoid air cells demonstrate no acute

## 2025-05-29 NOTE — ED PROVIDER NOTES
[facility] EMERGENCY DEPARTMENT  EMERGENCY DEPARTMENT ENCOUNTER      Pt Name: Fransisco Huddleston  MRN: [id number]  Birthdate 1955  Date of evaluation: 5/29/2025  Provider: JOSY Cadena CNP  12:53 AM    CHIEF COMPLAINT       Chief Complaint   Patient presents with    restless leg     Patient presents to ED with complaints of restless leg syndrome that he has had 30+ years. Patient takes medication for this for the past 15-20 years (Pramipexole). Patient in the last 2-3 weeks patient feels the medication working. Patient has tried different Rxs since then but they are not working. Patient was also goven Diazepam- but it only work \"60% efficiency.\" Patient states he has been unable to sleep in 5 days. Wife states he was doing strange things at night-like counting the dresser draws.     restless leg symptoms cont.     Patient states he does not remember doing this. Wife states he is not sleeping at all. Patient friends state he was slurring his speech (last night./today). Patient states he feels like he has a lot of energy but also feels tired at times.          HISTORY OF PRESENT ILLNESS    Fransisco Huddleston is a 69 y.o. male who presents to the emergency department for evaluation of restless leg syndrome.  Patient states that he has not slept in 5 days due to his legs not stopping moving.  Patient states he has had this for 30 years.  He follows with his primary care physician who has tried many different medications to help with this.  Finally ended up with Valium took 2 of those this morning and it did not help at all.  Patient is restless, walking around the triage exam room, unable to sit down or his legs will shake incessantly.  Wife states that this has been ongoing for 30 years and occasionally he has these episodes where he is completely unable to sleep because of the restless legs.  Patient states he will sit and just doze off and then his body will start jerking and jumping.  Last night, wife states

## 2025-05-30 ENCOUNTER — HOSPITAL ENCOUNTER (INPATIENT)
Age: 70
LOS: 4 days | Discharge: HOME | DRG: 091 | End: 2025-06-03
Attending: INTERNAL MEDICINE | Admitting: INTERNAL MEDICINE
Payer: MEDICARE

## 2025-05-30 ENCOUNTER — APPOINTMENT (OUTPATIENT)
Dept: MRI IMAGING | Age: 70
DRG: 091 | End: 2025-05-30
Attending: INTERNAL MEDICINE
Payer: MEDICARE

## 2025-05-30 ENCOUNTER — APPOINTMENT (OUTPATIENT)
Dept: GENERAL RADIOLOGY | Age: 70
DRG: 091 | End: 2025-05-30
Attending: INTERNAL MEDICINE
Payer: MEDICARE

## 2025-05-30 VITALS
DIASTOLIC BLOOD PRESSURE: 63 MMHG | SYSTOLIC BLOOD PRESSURE: 96 MMHG | TEMPERATURE: 97.9 F | BODY MASS INDEX: 29.51 KG/M2 | HEART RATE: 69 BPM | WEIGHT: 188 LBS | RESPIRATION RATE: 18 BRPM | OXYGEN SATURATION: 93 % | HEIGHT: 67 IN

## 2025-05-30 DIAGNOSIS — G25.81 RLS (RESTLESS LEGS SYNDROME): ICD-10-CM

## 2025-05-30 DIAGNOSIS — R55 VASOVAGAL RESPONSE: Primary | ICD-10-CM

## 2025-05-30 DIAGNOSIS — R55 NEAR SYNCOPE: ICD-10-CM

## 2025-05-30 PROBLEM — R41.82 ALTERED MENTAL STATUS: Status: ACTIVE | Noted: 2025-05-30

## 2025-05-30 PROBLEM — G21.0 NEUROLEPTIC MALIGNANT SYNDROME: Status: ACTIVE | Noted: 2025-05-30

## 2025-05-30 LAB
ALBUMIN SERPL-MCNC: 3.5 G/DL (ref 3.5–5.2)
ALBUMIN/GLOB SERPL: 1.5 {RATIO} (ref 1–2.5)
ALP SERPL-CCNC: 70 U/L (ref 40–129)
ALT SERPL-CCNC: 51 U/L (ref 10–50)
ANION GAP SERPL CALCULATED.3IONS-SCNC: 10 MMOL/L (ref 9–16)
AST SERPL-CCNC: 65 U/L (ref 10–50)
BASOPHILS # BLD: 0.03 K/UL (ref 0–0.2)
BASOPHILS NFR BLD: 0 % (ref 0–2)
BILIRUB SERPL-MCNC: 0.4 MG/DL (ref 0–1.2)
BUN SERPL-MCNC: 13 MG/DL (ref 8–23)
CA-I BLD-SCNC: 1.15 MMOL/L (ref 1.13–1.33)
CALCIUM SERPL-MCNC: 8.8 MG/DL (ref 8.6–10.4)
CHLORIDE SERPL-SCNC: 111 MMOL/L (ref 98–107)
CK SERPL-CCNC: 1047 U/L (ref 39–308)
CK SERPL-CCNC: 1133 U/L (ref 39–308)
CK SERPL-CCNC: 1155 U/L (ref 39–308)
CO2 SERPL-SCNC: 23 MMOL/L (ref 20–31)
CREAT SERPL-MCNC: 0.9 MG/DL (ref 0.7–1.2)
CRP SERPL HS-MCNC: 4.8 MG/L (ref 0–5)
EKG ATRIAL RATE: 104 BPM
EKG ATRIAL RATE: 69 BPM
EKG P AXIS: -13 DEGREES
EKG P AXIS: 56 DEGREES
EKG P-R INTERVAL: 252 MS
EKG P-R INTERVAL: 272 MS
EKG Q-T INTERVAL: 338 MS
EKG Q-T INTERVAL: 392 MS
EKG QRS DURATION: 96 MS
EKG QRS DURATION: 98 MS
EKG QTC CALCULATION (BAZETT): 420 MS
EKG QTC CALCULATION (BAZETT): 444 MS
EKG R AXIS: -21 DEGREES
EKG R AXIS: -45 DEGREES
EKG T AXIS: 76 DEGREES
EKG T AXIS: 77 DEGREES
EKG VENTRICULAR RATE: 104 BPM
EKG VENTRICULAR RATE: 69 BPM
EOSINOPHIL # BLD: 0.34 K/UL (ref 0–0.44)
EOSINOPHILS RELATIVE PERCENT: 4 % (ref 1–4)
ERYTHROCYTE [DISTWIDTH] IN BLOOD BY AUTOMATED COUNT: 13.7 % (ref 11.8–14.4)
EST. AVERAGE GLUCOSE BLD GHB EST-MCNC: 100 MG/DL
FIO2: 40
GFR, ESTIMATED: >90 ML/MIN/1.73M2
GLUCOSE BLD-MCNC: 104 MG/DL (ref 75–110)
GLUCOSE BLD-MCNC: 105 MG/DL (ref 75–110)
GLUCOSE BLD-MCNC: 73 MG/DL (ref 75–110)
GLUCOSE BLD-MCNC: 86 MG/DL (ref 75–110)
GLUCOSE BLD-MCNC: 88 MG/DL (ref 75–110)
GLUCOSE BLD-MCNC: 95 MG/DL (ref 74–100)
GLUCOSE SERPL-MCNC: 102 MG/DL (ref 74–99)
HBA1C MFR BLD: 5.1 % (ref 4–6)
HCT VFR BLD AUTO: 43.8 % (ref 40.7–50.3)
HGB BLD-MCNC: 14.2 G/DL (ref 13–17)
IMM GRANULOCYTES # BLD AUTO: 0.03 K/UL (ref 0–0.3)
IMM GRANULOCYTES NFR BLD: 0 %
LYMPHOCYTES NFR BLD: 1.79 K/UL (ref 1.1–3.7)
LYMPHOCYTES RELATIVE PERCENT: 22 % (ref 24–43)
MAGNESIUM SERPL-MCNC: 2.4 MG/DL (ref 1.6–2.4)
MCH RBC QN AUTO: 30.9 PG (ref 25.2–33.5)
MCHC RBC AUTO-ENTMCNC: 32.4 G/DL (ref 28.4–34.8)
MCV RBC AUTO: 95.4 FL (ref 82.6–102.9)
MONOCYTES NFR BLD: 0.64 K/UL (ref 0.1–1.2)
MONOCYTES NFR BLD: 8 % (ref 3–12)
MYOGLOBIN SERPL-MCNC: 130 NG/ML (ref 28–72)
MYOGLOBIN SERPL-MCNC: 227 NG/ML (ref 28–72)
NEUTROPHILS NFR BLD: 66 % (ref 36–65)
NEUTS SEG NFR BLD: 5.42 K/UL (ref 1.5–8.1)
NRBC BLD-RTO: 0 PER 100 WBC
PHOSPHATE SERPL-MCNC: 3 MG/DL (ref 2.5–4.5)
PLATELET # BLD AUTO: 171 K/UL (ref 138–453)
PMV BLD AUTO: 10.3 FL (ref 8.1–13.5)
POC HCO3: 26.5 MMOL/L (ref 21–28)
POC LACTIC ACID: 1.4 MMOL/L (ref 0.56–1.39)
POC O2 SATURATION: 99.4 % (ref 94–98)
POC PCO2: 41.5 MM HG (ref 35–48)
POC PH: 7.41 (ref 7.35–7.45)
POC PO2: 154.5 MM HG (ref 83–108)
POSITIVE BASE EXCESS, ART: 1.7 MMOL/L (ref 0–3)
POTASSIUM SERPL-SCNC: 3.6 MMOL/L (ref 3.7–5.3)
PROCALCITONIN SERPL-MCNC: 0.07 NG/ML (ref 0–0.09)
PROT SERPL-MCNC: 5.9 G/DL (ref 6.6–8.7)
RBC # BLD AUTO: 4.59 M/UL (ref 4.21–5.77)
SAMPLE SITE: ABNORMAL
SODIUM SERPL-SCNC: 144 MMOL/L (ref 136–145)
TROPONIN I SERPL HS-MCNC: 43 NG/L (ref 0–22)
TROPONIN I SERPL HS-MCNC: 44 NG/L (ref 0–22)
TSH SERPL DL<=0.05 MIU/L-ACNC: 0.8 UIU/ML (ref 0.27–4.2)
WBC OTHER # BLD: 8.3 K/UL (ref 3.5–11.3)

## 2025-05-30 PROCEDURE — 83874 ASSAY OF MYOGLOBIN: CPT

## 2025-05-30 PROCEDURE — 74018 RADEX ABDOMEN 1 VIEW: CPT

## 2025-05-30 PROCEDURE — 2500000003 HC RX 250 WO HCPCS

## 2025-05-30 PROCEDURE — 2500000003 HC RX 250 WO HCPCS: Performed by: EMERGENCY MEDICINE

## 2025-05-30 PROCEDURE — 83036 HEMOGLOBIN GLYCOSYLATED A1C: CPT

## 2025-05-30 PROCEDURE — 36600 WITHDRAWAL OF ARTERIAL BLOOD: CPT

## 2025-05-30 PROCEDURE — 85025 COMPLETE CBC W/AUTO DIFF WBC: CPT

## 2025-05-30 PROCEDURE — 70553 MRI BRAIN STEM W/O & W/DYE: CPT

## 2025-05-30 PROCEDURE — 6370000000 HC RX 637 (ALT 250 FOR IP): Performed by: PSYCHIATRY & NEUROLOGY

## 2025-05-30 PROCEDURE — A9576 INJ PROHANCE MULTIPACK: HCPCS | Performed by: PSYCHIATRY & NEUROLOGY

## 2025-05-30 PROCEDURE — 84484 ASSAY OF TROPONIN QUANT: CPT

## 2025-05-30 PROCEDURE — 2500000003 HC RX 250 WO HCPCS: Performed by: PSYCHIATRY & NEUROLOGY

## 2025-05-30 PROCEDURE — 83605 ASSAY OF LACTIC ACID: CPT

## 2025-05-30 PROCEDURE — 84100 ASSAY OF PHOSPHORUS: CPT

## 2025-05-30 PROCEDURE — 2700000000 HC OXYGEN THERAPY PER DAY

## 2025-05-30 PROCEDURE — 82803 BLOOD GASES ANY COMBINATION: CPT

## 2025-05-30 PROCEDURE — 84443 ASSAY THYROID STIM HORMONE: CPT

## 2025-05-30 PROCEDURE — 82550 ASSAY OF CK (CPK): CPT

## 2025-05-30 PROCEDURE — 6360000002 HC RX W HCPCS

## 2025-05-30 PROCEDURE — 83735 ASSAY OF MAGNESIUM: CPT

## 2025-05-30 PROCEDURE — 84145 PROCALCITONIN (PCT): CPT

## 2025-05-30 PROCEDURE — 87040 BLOOD CULTURE FOR BACTERIA: CPT

## 2025-05-30 PROCEDURE — 99223 1ST HOSP IP/OBS HIGH 75: CPT | Performed by: PSYCHIATRY & NEUROLOGY

## 2025-05-30 PROCEDURE — 93010 ELECTROCARDIOGRAM REPORT: CPT | Performed by: STUDENT IN AN ORGANIZED HEALTH CARE EDUCATION/TRAINING PROGRAM

## 2025-05-30 PROCEDURE — 2000000000 HC ICU R&B

## 2025-05-30 PROCEDURE — 82330 ASSAY OF CALCIUM: CPT

## 2025-05-30 PROCEDURE — 86140 C-REACTIVE PROTEIN: CPT

## 2025-05-30 PROCEDURE — 94002 VENT MGMT INPAT INIT DAY: CPT

## 2025-05-30 PROCEDURE — 6360000004 HC RX CONTRAST MEDICATION: Performed by: PSYCHIATRY & NEUROLOGY

## 2025-05-30 PROCEDURE — 36415 COLL VENOUS BLD VENIPUNCTURE: CPT

## 2025-05-30 PROCEDURE — 99291 CRITICAL CARE FIRST HOUR: CPT | Performed by: INTERNAL MEDICINE

## 2025-05-30 PROCEDURE — 94761 N-INVAS EAR/PLS OXIMETRY MLT: CPT

## 2025-05-30 PROCEDURE — 95700 EEG CONT REC W/VID EEG TECH: CPT

## 2025-05-30 PROCEDURE — 82947 ASSAY GLUCOSE BLOOD QUANT: CPT

## 2025-05-30 PROCEDURE — 80053 COMPREHEN METABOLIC PANEL: CPT

## 2025-05-30 PROCEDURE — 6370000000 HC RX 637 (ALT 250 FOR IP)

## 2025-05-30 PROCEDURE — 6360000002 HC RX W HCPCS: Performed by: EMERGENCY MEDICINE

## 2025-05-30 PROCEDURE — 2580000003 HC RX 258

## 2025-05-30 PROCEDURE — 95712 VEEG 2-12 HR INTMT MNTR: CPT

## 2025-05-30 RX ORDER — ENOXAPARIN SODIUM 100 MG/ML
40 INJECTION SUBCUTANEOUS DAILY
Status: DISCONTINUED | OUTPATIENT
Start: 2025-05-30 | End: 2025-06-03 | Stop reason: HOSPADM

## 2025-05-30 RX ORDER — MAGNESIUM SULFATE IN WATER 40 MG/ML
2000 INJECTION, SOLUTION INTRAVENOUS PRN
Status: DISCONTINUED | OUTPATIENT
Start: 2025-05-30 | End: 2025-06-03 | Stop reason: HOSPADM

## 2025-05-30 RX ORDER — SODIUM CHLORIDE 0.9 % (FLUSH) 0.9 %
5-40 SYRINGE (ML) INJECTION PRN
Status: DISCONTINUED | OUTPATIENT
Start: 2025-05-30 | End: 2025-06-03 | Stop reason: HOSPADM

## 2025-05-30 RX ORDER — SODIUM CHLORIDE 0.9 % (FLUSH) 0.9 %
10 SYRINGE (ML) INJECTION PRN
Status: DISCONTINUED | OUTPATIENT
Start: 2025-05-30 | End: 2025-06-03 | Stop reason: HOSPADM

## 2025-05-30 RX ORDER — ONDANSETRON 2 MG/ML
4 INJECTION INTRAMUSCULAR; INTRAVENOUS EVERY 6 HOURS PRN
Status: DISCONTINUED | OUTPATIENT
Start: 2025-05-30 | End: 2025-06-03 | Stop reason: HOSPADM

## 2025-05-30 RX ORDER — MIDAZOLAM HYDROCHLORIDE 1 MG/ML
1-10 INJECTION, SOLUTION INTRAVENOUS CONTINUOUS
Status: DISCONTINUED | OUTPATIENT
Start: 2025-05-30 | End: 2025-06-02

## 2025-05-30 RX ORDER — POLYETHYLENE GLYCOL 3350 17 G/17G
17 POWDER, FOR SOLUTION ORAL DAILY PRN
Status: DISCONTINUED | OUTPATIENT
Start: 2025-05-30 | End: 2025-06-03 | Stop reason: HOSPADM

## 2025-05-30 RX ORDER — SODIUM CHLORIDE 0.9 % (FLUSH) 0.9 %
5-40 SYRINGE (ML) INJECTION EVERY 12 HOURS SCHEDULED
Status: DISCONTINUED | OUTPATIENT
Start: 2025-05-30 | End: 2025-06-03 | Stop reason: HOSPADM

## 2025-05-30 RX ORDER — ACETAMINOPHEN 650 MG/1
650 SUPPOSITORY RECTAL EVERY 6 HOURS PRN
Status: DISCONTINUED | OUTPATIENT
Start: 2025-05-30 | End: 2025-06-03 | Stop reason: HOSPADM

## 2025-05-30 RX ORDER — PROPOFOL 10 MG/ML
5-50 INJECTION, EMULSION INTRAVENOUS CONTINUOUS
Status: DISCONTINUED | OUTPATIENT
Start: 2025-05-30 | End: 2025-06-02

## 2025-05-30 RX ORDER — ACETAMINOPHEN 325 MG/1
650 TABLET ORAL EVERY 6 HOURS PRN
Status: DISCONTINUED | OUTPATIENT
Start: 2025-05-30 | End: 2025-06-03 | Stop reason: HOSPADM

## 2025-05-30 RX ORDER — POTASSIUM CHLORIDE 29.8 MG/ML
20 INJECTION INTRAVENOUS PRN
Status: DISCONTINUED | OUTPATIENT
Start: 2025-05-30 | End: 2025-06-03 | Stop reason: HOSPADM

## 2025-05-30 RX ORDER — LEVOTHYROXINE SODIUM 75 UG/1
150 TABLET ORAL DAILY
Status: DISCONTINUED | OUTPATIENT
Start: 2025-05-30 | End: 2025-06-03 | Stop reason: HOSPADM

## 2025-05-30 RX ORDER — POTASSIUM CHLORIDE 7.45 MG/ML
10 INJECTION INTRAVENOUS PRN
Status: DISCONTINUED | OUTPATIENT
Start: 2025-05-30 | End: 2025-06-03 | Stop reason: HOSPADM

## 2025-05-30 RX ORDER — ONDANSETRON 4 MG/1
4 TABLET, ORALLY DISINTEGRATING ORAL EVERY 8 HOURS PRN
Status: DISCONTINUED | OUTPATIENT
Start: 2025-05-30 | End: 2025-06-03 | Stop reason: HOSPADM

## 2025-05-30 RX ORDER — ROCURONIUM BROMIDE 10 MG/ML
1 INJECTION, SOLUTION INTRAVENOUS
Status: DISCONTINUED | OUTPATIENT
Start: 2025-05-30 | End: 2025-05-30 | Stop reason: HOSPADM

## 2025-05-30 RX ORDER — BROMOCRIPTINE MESYLATE 2.5 MG/1
2.5 TABLET ORAL EVERY 6 HOURS
Status: DISCONTINUED | OUTPATIENT
Start: 2025-05-30 | End: 2025-06-01

## 2025-05-30 RX ORDER — SODIUM CHLORIDE 9 MG/ML
INJECTION, SOLUTION INTRAVENOUS PRN
Status: DISCONTINUED | OUTPATIENT
Start: 2025-05-30 | End: 2025-06-03 | Stop reason: HOSPADM

## 2025-05-30 RX ORDER — SODIUM CHLORIDE, SODIUM LACTATE, POTASSIUM CHLORIDE, CALCIUM CHLORIDE 600; 310; 30; 20 MG/100ML; MG/100ML; MG/100ML; MG/100ML
INJECTION, SOLUTION INTRAVENOUS CONTINUOUS
Status: DISCONTINUED | OUTPATIENT
Start: 2025-05-30 | End: 2025-06-02

## 2025-05-30 RX ORDER — EZETIMIBE 10 MG/1
10 TABLET ORAL DAILY
Status: DISCONTINUED | OUTPATIENT
Start: 2025-05-30 | End: 2025-05-30

## 2025-05-30 RX ADMIN — FAMOTIDINE 20 MG: 10 INJECTION, SOLUTION INTRAVENOUS at 21:51

## 2025-05-30 RX ADMIN — PROPOFOL 35 MCG/KG/MIN: 10 INJECTION, EMULSION INTRAVENOUS at 20:48

## 2025-05-30 RX ADMIN — SODIUM CHLORIDE, SODIUM LACTATE, POTASSIUM CHLORIDE, AND CALCIUM CHLORIDE: .6; .31; .03; .02 INJECTION, SOLUTION INTRAVENOUS at 09:25

## 2025-05-30 RX ADMIN — ROCURONIUM BROMIDE 85 MG: 10 INJECTION, SOLUTION INTRAVENOUS at 00:45

## 2025-05-30 RX ADMIN — GADOTERIDOL 15 ML: 279.3 INJECTION, SOLUTION INTRAVENOUS at 15:31

## 2025-05-30 RX ADMIN — LEVOTHYROXINE SODIUM 150 MCG: 0.07 TABLET ORAL at 13:23

## 2025-05-30 RX ADMIN — PROPOFOL 35 MCG/KG/MIN: 10 INJECTION, EMULSION INTRAVENOUS at 03:05

## 2025-05-30 RX ADMIN — SODIUM CHLORIDE, PRESERVATIVE FREE 10 ML: 5 INJECTION INTRAVENOUS at 21:51

## 2025-05-30 RX ADMIN — ENOXAPARIN SODIUM 40 MG: 100 INJECTION SUBCUTANEOUS at 09:28

## 2025-05-30 RX ADMIN — BROMOCRIPTINE MESYLATE 2.5 MG: 2.5 TABLET ORAL at 21:51

## 2025-05-30 RX ADMIN — SODIUM CHLORIDE, SODIUM LACTATE, POTASSIUM CHLORIDE, AND CALCIUM CHLORIDE: .6; .31; .03; .02 INJECTION, SOLUTION INTRAVENOUS at 14:21

## 2025-05-30 RX ADMIN — FAMOTIDINE 20 MG: 10 INJECTION, SOLUTION INTRAVENOUS at 09:28

## 2025-05-30 RX ADMIN — PROPOFOL 30 MCG/KG/MIN: 10 INJECTION, EMULSION INTRAVENOUS at 14:24

## 2025-05-30 RX ADMIN — PROPOFOL 30 MCG/KG/MIN: 10 INJECTION, EMULSION INTRAVENOUS at 14:19

## 2025-05-30 RX ADMIN — BROMOCRIPTINE MESYLATE 2.5 MG: 2.5 TABLET ORAL at 14:25

## 2025-05-30 RX ADMIN — Medication 50 MCG/HR: at 19:38

## 2025-05-30 RX ADMIN — SODIUM CHLORIDE, SODIUM LACTATE, POTASSIUM CHLORIDE, AND CALCIUM CHLORIDE: .6; .31; .03; .02 INJECTION, SOLUTION INTRAVENOUS at 20:19

## 2025-05-30 RX ADMIN — SODIUM CHLORIDE, PRESERVATIVE FREE 10 ML: 5 INJECTION INTRAVENOUS at 15:32

## 2025-05-30 RX ADMIN — PROPOFOL 30 MCG/KG/MIN: 10 INJECTION, EMULSION INTRAVENOUS at 09:17

## 2025-05-30 ASSESSMENT — PULMONARY FUNCTION TESTS
PIF_VALUE: 21
PIF_VALUE: 20
PIF_VALUE: 20
PIF_VALUE: 22
PIF_VALUE: 19
PIF_VALUE: 23
PIF_VALUE: 10
PIF_VALUE: 23
PIF_VALUE: 20

## 2025-05-30 NOTE — PROGRESS NOTES
Charting intubations and reintubations    ETT Size (e.g. 7.5) 7.5  ETT Placement (e.g. 23 cm at lips) 22 lip  ETT secured with (commercial device name) hollester    Tube placement verified by:  Auscultation (yes/no) y  Positive color change on end tidal CO2 detector (yes/no) y  CXR (yes/no) y    Reason for intubation (jot a quick note/phrase e.g. Impending respiratory failure) airway protection    If difficult airway, was red tape placed (yes/no) not difficult airway  If code situation, was rescue pod used? (yes/no) na    -- Notify charge therapist regarding all intubations, extubations, reintubations and self extubations    Yi Mascorro RCP  11:14 PM

## 2025-05-30 NOTE — ED NOTES
Pt in bed, continues to be restless, throwing hands, legs in air. Starts to fall asleep then wakes up throwing hands, legs. Ema LAZAR at bedside.

## 2025-05-30 NOTE — H&P
Critical Care - History and Physical Examination    Patient's name:  Fransisco Huddleston  Medical Record Number: [id number]  Patient's account/billing number: [id number]  Patient's YOB: 1955  Age: 69 y.o.  Date of Admission: 5/30/2025  5:43 AM  Date of History and Physical Examination: 5/30/2025    Primary Care Physician: Ritesh Brush MD  Attending Physician: James Rankin MD     Code Status: Full Code    Chief complaint: No chief complaint on file.      HISTORY OF PRESENT ILLNESS:   Fransisco Huddleston is a 69 y.o. male with a past medical history significant for paroxysmal A-fib, aortic regurgitation, GERD, mitral valve regurgitation, restless leg syndrome and hypothyroidism initially presented to the [place] ER with a chief complaint of worsening of the restless leg syndrome.  As per chart review, patient has been diagnosed with restless leg syndrome for more than 30+ years and he has been taking pramipexole for it for last 20 years.  However, over the last 2 to 3 weeks patient felt like medication is not helping him.  Since then, he has tried different medication including diazepam.  As per chart review, patient's wife stated that patient has not slept in for last 5 days and has been acting barely at home.  Patient seems to have pressured speech and a lot of energy as well.    As per chart review, patient did not have any history of psychiatric disorder.  He has been tried on multiple medication for his restless leg syndrome.  Recently he has been on THC and diazepam without any relief.  Patient received multiple doses of Ativan without any improvement.  He got a dose of Haldol after which patient was agitated and fidgety and there was concern for dystonic reaction and hence, he received a dose of Benadryl.  This medication did not help him and he received more Ativan and Cogentin.  ER physician discussed the case with the neurology and they were concerned that patient might be going through

## 2025-05-30 NOTE — ED NOTES
DR Shen remains at bedside, dr shen updates spouse at bedside and son via phone post conversation with neuro,

## 2025-05-30 NOTE — CONSULTS
[facility]   IN-PATIENT SERVICE      NEUROLOGY CONSULT  NOTE            Date:   5/30/2025  Patient name:  Fransisco Huddleston  Date of admission:  5/30/2025  YOB: 1955      Chief Complaint:     Altered mental status    Reason for Consult:      Altered mental status    History of Present Illness:     The patient is a 69 y.o. male who presents with delirium.  . The patient was seen and examined and the chart was reviewed.  Patient with significant history of restless leg syndrome on Mirapex at home presents to the ED after 5-day history of significant sleep deprivation and increasing altered mentation.  Much of history obtained from patient's sons at bedside.  Patient has dealt with history of sleep deprivation in the past, more recently improving his overall sleep hygiene getting rid of sugars, late-night snacks and adding CBD Gummies which has seemed to work effectively for him.  Not much sure that regimen had been changed up until recently although had a couple days of sleep deprivation.  Followed up with PCP for this and was started on Valium.  Despite taking the Valium this did not help his sleep and he continued to be more impulsive, almost manic in a way presenting to the ED at [facility] last night.  In the ED he did become very impulsive, restless requiring Ativan which did not help very much followed by Benadryl.  Eventually was given Haldol.  After the Haldol was given he was noted to have diaphoresis tachycardia with increasing alteration in mentation.  There was concern for possible extraparametal side effect versus neuroleptic malignant syndrome.  He was also given Cogentin with additional doses of Benadryl and benzodiazepines.  No improvement overall in patient's level of arousal worsened, not protecting his airway, unfortunately requiring intubation.  He was initiated on Precedex drip.  He continued receiving further doses of Ativan and eventually being transferred to [facility]

## 2025-05-30 NOTE — ED NOTES
Pt will not lay in bed, states he cannot stay laying, sitting on side of bed. Pt reminded he cannot get up. He needs to try to rest. Pt states he understands and will just sit on side of bed

## 2025-05-31 PROBLEM — G25.81 RLS (RESTLESS LEGS SYNDROME): Status: ACTIVE | Noted: 2025-05-31

## 2025-05-31 PROBLEM — G92.8 TOXIC METABOLIC ENCEPHALOPATHY: Status: ACTIVE | Noted: 2025-05-31

## 2025-05-31 PROBLEM — G93.41 ACUTE METABOLIC ENCEPHALOPATHY: Status: ACTIVE | Noted: 2025-05-31

## 2025-05-31 PROBLEM — Z72.820 SLEEP DEPRIVATION: Status: ACTIVE | Noted: 2025-05-31

## 2025-05-31 LAB
ALBUMIN SERPL-MCNC: 3.1 G/DL (ref 3.5–5.2)
ALBUMIN/GLOB SERPL: 1.3 {RATIO} (ref 1–2.5)
ALLEN TEST: ABNORMAL
ALP SERPL-CCNC: 66 U/L (ref 40–129)
ALT SERPL-CCNC: 44 U/L (ref 10–50)
ANION GAP SERPL CALCULATED.3IONS-SCNC: 9 MMOL/L (ref 9–16)
AST SERPL-CCNC: 52 U/L (ref 10–50)
BASOPHILS # BLD: 0.06 K/UL (ref 0–0.2)
BASOPHILS NFR BLD: 1 % (ref 0–2)
BILIRUB DIRECT SERPL-MCNC: 0.3 MG/DL (ref 0–0.2)
BILIRUB INDIRECT SERPL-MCNC: 0.5 MG/DL (ref 0–1)
BILIRUB SERPL-MCNC: 0.8 MG/DL (ref 0–1.2)
BUN SERPL-MCNC: 11 MG/DL (ref 8–23)
CALCIUM SERPL-MCNC: 8.5 MG/DL (ref 8.6–10.4)
CHLORIDE SERPL-SCNC: 109 MMOL/L (ref 98–107)
CK SERPL-CCNC: 574 U/L (ref 39–308)
CK SERPL-CCNC: 764 U/L (ref 39–308)
CK SERPL-CCNC: 786 U/L (ref 39–308)
CO2 SERPL-SCNC: 22 MMOL/L (ref 20–31)
CREAT SERPL-MCNC: 0.9 MG/DL (ref 0.7–1.2)
EOSINOPHIL # BLD: 0.64 K/UL (ref 0–0.44)
EOSINOPHILS RELATIVE PERCENT: 6 % (ref 1–4)
ERYTHROCYTE [DISTWIDTH] IN BLOOD BY AUTOMATED COUNT: 13.7 % (ref 11.8–14.4)
FERRITIN SERPL-MCNC: 321 NG/ML
FIO2: 30
GFR, ESTIMATED: >90 ML/MIN/1.73M2
GLOBULIN SER CALC-MCNC: 2.3 G/DL
GLUCOSE BLD-MCNC: 97 MG/DL (ref 74–100)
GLUCOSE SERPL-MCNC: 104 MG/DL (ref 74–99)
HCT VFR BLD AUTO: 41.1 % (ref 40.7–50.3)
HGB BLD-MCNC: 13.6 G/DL (ref 13–17)
IMM GRANULOCYTES # BLD AUTO: 0.05 K/UL (ref 0–0.3)
IMM GRANULOCYTES NFR BLD: 1 %
IRON SATN MFR SERPL: 29 % (ref 20–55)
IRON SERPL-MCNC: 63 UG/DL (ref 61–157)
LYMPHOCYTES NFR BLD: 1.68 K/UL (ref 1.1–3.7)
LYMPHOCYTES RELATIVE PERCENT: 17 % (ref 24–43)
MAGNESIUM SERPL-MCNC: 2.1 MG/DL (ref 1.6–2.4)
MCH RBC QN AUTO: 31 PG (ref 25.2–33.5)
MCHC RBC AUTO-ENTMCNC: 33.1 G/DL (ref 28.4–34.8)
MCV RBC AUTO: 93.6 FL (ref 82.6–102.9)
MONOCYTES NFR BLD: 0.73 K/UL (ref 0.1–1.2)
MONOCYTES NFR BLD: 7 % (ref 3–12)
MYOGLOBIN SERPL-MCNC: 244 NG/ML (ref 28–72)
MYOGLOBIN SERPL-MCNC: 354 NG/ML (ref 28–72)
MYOGLOBIN SERPL-MCNC: 92 NG/ML (ref 28–72)
NEUTROPHILS NFR BLD: 68 % (ref 36–65)
NEUTS SEG NFR BLD: 7.04 K/UL (ref 1.5–8.1)
NRBC BLD-RTO: 0 PER 100 WBC
PLATELET # BLD AUTO: 193 K/UL (ref 138–453)
PMV BLD AUTO: 10.8 FL (ref 8.1–13.5)
POC HCO3: 23.7 MMOL/L (ref 21–28)
POC LACTIC ACID: 1.2 MMOL/L (ref 0.56–1.39)
POC O2 SATURATION: 96.6 % (ref 94–98)
POC PCO2: 29.7 MM HG (ref 35–48)
POC PH: 7.51 (ref 7.35–7.45)
POC PO2: 76.6 MM HG (ref 83–108)
POSITIVE BASE EXCESS, ART: 1.4 MMOL/L (ref 0–3)
POTASSIUM SERPL-SCNC: 3.4 MMOL/L (ref 3.7–5.3)
PROT SERPL-MCNC: 5.4 G/DL (ref 6.6–8.7)
RBC # BLD AUTO: 4.39 M/UL (ref 4.21–5.77)
SAMPLE SITE: ABNORMAL
SODIUM SERPL-SCNC: 140 MMOL/L (ref 136–145)
TIBC SERPL-MCNC: 221 UG/DL (ref 250–450)
UNSATURATED IRON BINDING CAPACITY: 158 UG/DL (ref 112–347)
WBC OTHER # BLD: 10.2 K/UL (ref 3.5–11.3)

## 2025-05-31 PROCEDURE — 95714 VEEG EA 12-26 HR UNMNTR: CPT

## 2025-05-31 PROCEDURE — 83735 ASSAY OF MAGNESIUM: CPT

## 2025-05-31 PROCEDURE — 80048 BASIC METABOLIC PNL TOTAL CA: CPT

## 2025-05-31 PROCEDURE — 82947 ASSAY GLUCOSE BLOOD QUANT: CPT

## 2025-05-31 PROCEDURE — 82803 BLOOD GASES ANY COMBINATION: CPT

## 2025-05-31 PROCEDURE — 82550 ASSAY OF CK (CPK): CPT

## 2025-05-31 PROCEDURE — 2700000000 HC OXYGEN THERAPY PER DAY

## 2025-05-31 PROCEDURE — 83874 ASSAY OF MYOGLOBIN: CPT

## 2025-05-31 PROCEDURE — 6370000000 HC RX 637 (ALT 250 FOR IP): Performed by: PSYCHIATRY & NEUROLOGY

## 2025-05-31 PROCEDURE — 83605 ASSAY OF LACTIC ACID: CPT

## 2025-05-31 PROCEDURE — 2580000003 HC RX 258

## 2025-05-31 PROCEDURE — 85025 COMPLETE CBC W/AUTO DIFF WBC: CPT

## 2025-05-31 PROCEDURE — 94003 VENT MGMT INPAT SUBQ DAY: CPT

## 2025-05-31 PROCEDURE — 6360000002 HC RX W HCPCS

## 2025-05-31 PROCEDURE — 2500000003 HC RX 250 WO HCPCS

## 2025-05-31 PROCEDURE — 95720 EEG PHY/QHP EA INCR W/VEEG: CPT | Performed by: PSYCHIATRY & NEUROLOGY

## 2025-05-31 PROCEDURE — 6370000000 HC RX 637 (ALT 250 FOR IP)

## 2025-05-31 PROCEDURE — 83540 ASSAY OF IRON: CPT

## 2025-05-31 PROCEDURE — 94760 N-INVAS EAR/PLS OXIMETRY 1: CPT

## 2025-05-31 PROCEDURE — 99232 SBSQ HOSP IP/OBS MODERATE 35: CPT | Performed by: PSYCHIATRY & NEUROLOGY

## 2025-05-31 PROCEDURE — 2000000000 HC ICU R&B

## 2025-05-31 PROCEDURE — 80076 HEPATIC FUNCTION PANEL: CPT

## 2025-05-31 PROCEDURE — 83550 IRON BINDING TEST: CPT

## 2025-05-31 PROCEDURE — 99291 CRITICAL CARE FIRST HOUR: CPT | Performed by: INTERNAL MEDICINE

## 2025-05-31 PROCEDURE — 36415 COLL VENOUS BLD VENIPUNCTURE: CPT

## 2025-05-31 PROCEDURE — 36600 WITHDRAWAL OF ARTERIAL BLOOD: CPT

## 2025-05-31 PROCEDURE — APPSS30 APP SPLIT SHARED TIME 16-30 MINUTES: Performed by: NURSE PRACTITIONER

## 2025-05-31 PROCEDURE — 82728 ASSAY OF FERRITIN: CPT

## 2025-05-31 RX ORDER — SODIUM CHLORIDE, SODIUM LACTATE, POTASSIUM CHLORIDE, AND CALCIUM CHLORIDE .6; .31; .03; .02 G/100ML; G/100ML; G/100ML; G/100ML
500 INJECTION, SOLUTION INTRAVENOUS ONCE
Status: COMPLETED | OUTPATIENT
Start: 2025-05-31 | End: 2025-05-31

## 2025-05-31 RX ORDER — NOREPINEPHRINE BITARTRATE 0.06 MG/ML
1-100 INJECTION, SOLUTION INTRAVENOUS CONTINUOUS
Status: DISCONTINUED | OUTPATIENT
Start: 2025-05-31 | End: 2025-06-02

## 2025-05-31 RX ADMIN — FAMOTIDINE 20 MG: 10 INJECTION, SOLUTION INTRAVENOUS at 10:23

## 2025-05-31 RX ADMIN — POTASSIUM BICARBONATE 40 MEQ: 782 TABLET, EFFERVESCENT ORAL at 13:41

## 2025-05-31 RX ADMIN — PROPOFOL 25 MCG/KG/MIN: 10 INJECTION, EMULSION INTRAVENOUS at 03:02

## 2025-05-31 RX ADMIN — SODIUM CHLORIDE, SODIUM LACTATE, POTASSIUM CHLORIDE, AND CALCIUM CHLORIDE: .6; .31; .03; .02 INJECTION, SOLUTION INTRAVENOUS at 11:08

## 2025-05-31 RX ADMIN — SODIUM CHLORIDE, SODIUM LACTATE, POTASSIUM CHLORIDE, AND CALCIUM CHLORIDE: .6; .31; .03; .02 INJECTION, SOLUTION INTRAVENOUS at 20:02

## 2025-05-31 RX ADMIN — ENOXAPARIN SODIUM 40 MG: 100 INJECTION SUBCUTANEOUS at 10:23

## 2025-05-31 RX ADMIN — PROPOFOL 15 MCG/KG/MIN: 10 INJECTION, EMULSION INTRAVENOUS at 17:07

## 2025-05-31 RX ADMIN — BROMOCRIPTINE MESYLATE 2.5 MG: 2.5 TABLET ORAL at 10:22

## 2025-05-31 RX ADMIN — Medication 50 MCG/HR: at 19:30

## 2025-05-31 RX ADMIN — FAMOTIDINE 20 MG: 10 INJECTION, SOLUTION INTRAVENOUS at 19:52

## 2025-05-31 RX ADMIN — LEVOTHYROXINE SODIUM 150 MCG: 0.07 TABLET ORAL at 10:22

## 2025-05-31 RX ADMIN — SODIUM CHLORIDE, SODIUM LACTATE, POTASSIUM CHLORIDE, AND CALCIUM CHLORIDE 500 ML: .6; .31; .03; .02 INJECTION, SOLUTION INTRAVENOUS at 06:22

## 2025-05-31 RX ADMIN — SODIUM CHLORIDE, PRESERVATIVE FREE 10 ML: 5 INJECTION INTRAVENOUS at 19:52

## 2025-05-31 RX ADMIN — SODIUM CHLORIDE, SODIUM LACTATE, POTASSIUM CHLORIDE, AND CALCIUM CHLORIDE: .6; .31; .03; .02 INJECTION, SOLUTION INTRAVENOUS at 01:19

## 2025-05-31 RX ADMIN — Medication 5 MCG/MIN: at 06:31

## 2025-05-31 RX ADMIN — BROMOCRIPTINE MESYLATE 2.5 MG: 2.5 TABLET ORAL at 13:42

## 2025-05-31 RX ADMIN — BROMOCRIPTINE MESYLATE 2.5 MG: 2.5 TABLET ORAL at 19:52

## 2025-05-31 RX ADMIN — BROMOCRIPTINE MESYLATE 2.5 MG: 2.5 TABLET ORAL at 03:00

## 2025-05-31 ASSESSMENT — PULMONARY FUNCTION TESTS
PIF_VALUE: 11
PIF_VALUE: 23
PIF_VALUE: 25
PIF_VALUE: 29
PIF_VALUE: 13
PIF_VALUE: 21
PIF_VALUE: 25
PIF_VALUE: 26

## 2025-05-31 NOTE — PLAN OF CARE
Problem: Chronic Conditions and Co-morbidities  Goal: Patient's chronic conditions and co-morbidity symptoms are monitored and maintained or improved  Outcome: Progressing  Flowsheets (Taken 5/31/2025 0800)  Care Plan - Patient's Chronic Conditions and Co-Morbidity Symptoms are Monitored and Maintained or Improved: Update acute care plan with appropriate goals if chronic or comorbid symptoms are exacerbated and prevent overall improvement and discharge     Problem: Discharge Planning  Goal: Discharge to home or other facility with appropriate resources  Outcome: Progressing  Flowsheets (Taken 5/31/2025 0800)  Discharge to home or other facility with appropriate resources: Refer to discharge planning if patient needs post-hospital services based on physician order or complex needs related to functional status, cognitive ability or social support system     Problem: Safety - Medical Restraint  Goal: Remains free of injury from restraints (Restraint for Interference with Medical Device)  Description: INTERVENTIONS:1. Determine that other, less restrictive measures have been tried or would not be effective before applying the restraint2. Evaluate the patient's condition at the time of restraint application3. Inform patient/family regarding the reason for restraint4. Q2H: Monitor safety, psychosocial status, comfort, nutrition and hydration  Outcome: Progressing  Flowsheets  Taken 5/31/2025 1000 by Jose Clay RN  Remains free of injury from restraints (restraint for interference with medical device): Every 2 hours: Monitor safety, psychosocial status, comfort, nutrition and hydration  Taken 5/31/2025 0800 by Jose Clay RN  Remains free of injury from restraints (restraint for interference with medical device): Every 2 hours: Monitor safety, psychosocial status, comfort, nutrition and hydration  Taken 5/31/2025 0654 by Jaspreet Galicia RN  Remains free of injury from restraints (restraint for interference with

## 2025-05-31 NOTE — PLAN OF CARE
Problem: Chronic Conditions and Co-morbidities  Goal: Patient's chronic conditions and co-morbidity symptoms are monitored and maintained or improved  Outcome: Progressing     Problem: Discharge Planning  Goal: Discharge to home or other facility with appropriate resources  Outcome: Progressing     Problem: Safety - Medical Restraint  Goal: Remains free of injury from restraints (Restraint for Interference with Medical Device)  Description: INTERVENTIONS:1. Determine that other, less restrictive measures have been tried or would not be effective before applying the restraint2. Evaluate the patient's condition at the time of restraint application3. Inform patient/family regarding the reason for restraint4. Q2H: Monitor safety, psychosocial status, comfort, nutrition and hydration  Outcome: Progressing  Flowsheets  Taken 5/30/2025 2200 by Jaspreet Galicia RN  Remains free of injury from restraints (restraint for interference with medical device):   Every 2 hours: Monitor safety, psychosocial status, comfort, nutrition and hydration   Evaluate the patient's condition at the time of restraint application  Taken 5/30/2025 2000 by Claribel Lieberman RN  Remains free of injury from restraints (restraint for interference with medical device):   Every 2 hours: Monitor safety, psychosocial status, comfort, nutrition and hydration   Inform patient/family regarding the reason for restraint   Evaluate the patient's condition at the time of restraint application   Determine that other, less restrictive measures have been tried or would not be effective before applying the restraint  Taken 5/30/2025 1800 by Jose Clay RN  Remains free of injury from restraints (restraint for interference with medical device): Every 2 hours: Monitor safety, psychosocial status, comfort, nutrition and hydration  Taken 5/30/2025 1600 by Jose Clay RN  Remains free of injury from restraints (restraint for interference with medical device):

## 2025-05-31 NOTE — CARE COORDINATION
Case Management Assessment  Initial Evaluation    Date/Time of Evaluation: 5/31/2025 3:20 PM  Assessment Completed by: LUCERO LI RN    If patient is discharged prior to next notation, then this note serves as note for discharge by case management.    Patient Name: Fransisco Huddleston                   YOB: 1955  Diagnosis: Altered mental status [R41.82]  Neuroleptic malignant syndrome [G21.0]                   Date / Time: 5/30/2025  5:43 AM    Patient Admission Status: Inpatient   Readmission Risk (Low < 19, Mod (19-27), High > 27): Readmission Risk Score: 13.2    Current PCP: Ritesh Brush MD  PCP verified by CM? (P) Yes    Chart Reviewed: Yes      History Provided by: (P) Spouse  Patient Orientation: (P) Sedated    Patient Cognition: (P) Other (see comment) (Intubated)    Hospitalization in the last 30 days (Readmission):  No    If yes, Readmission Assessment in  Navigator will be completed.    Advance Directives:      Code Status: Full Code   Patient's Primary Decision Maker is: (P) Legal Next of Kin      Discharge Planning:    Patient lives with: (P) Spouse/Significant Other Type of Home: (P) House  Primary Care Giver: (P) Self  Patient Support Systems include: (P) Spouse/Significant Other   Current Financial resources: (P) Medicare  Current community resources:    Current services prior to admission: (P) None            Current DME:              Type of Home Care services:  (P) None    ADLS  Prior functional level: (P) Independent in ADLs/IADLs  Current functional level: (P) Assistance with the following:, Bathing, Dressing, Toileting, Feeding, Cooking, Housework, Shopping, Mobility    PT AM-PAC:   /24  OT AM-PAC:   /24    Family can provide assistance at DC: (P) Yes  Would you like Case Management to discuss the discharge plan with any other family members/significant others, and if so, who? (P) Yes (Spouse)  Plans to Return to Present Housing: (P) Unknown at present  Other Identified

## 2025-05-31 NOTE — CONSULTS
Referring physician: Dr. Santillan  Date: 5/31/2025  Start Time: 5/30/2025 @ 1620  End Time: 5/31/2025 @ 0630    Indication  Patient with encephalopathy, EEG done to rule out subclinical seizures.       Introduction  This continuous video-EEG was acquired using a [employer] workstation at 256 samples/s. Electrodes were placed according to the International 10-20 system. Automated spike and seizure detection algorithms were applied. Video was recorded during this study.    Description  The background consistent of diffuse  reactive polymorphic delta and theta slowing, at times up to 9 Hz activity seen. No consistent focal slowing or interhemispheric asymmetry was noted. Normal sleep structures were observed. There were no definitive/outstanding interictal epileptiform discharges or electrographic seizures.    Events  5/30-31/2025: no events reported.       Impression  Abnormal continuous vEEG recording, the slowing mentioned above suggests mild-moderate non specific encephalopathy.     No definitive epileptiform discharges were identified.  Please note the absence of   such activity in this record cannot conclusively rule out an epileptic   disorder.  If such is still clinically suspected, a repeat study with   prolonged sampling may be helpful.    Shauna Rebollar MD  Epilepsy Board Certified.  Neurology Board Certified.    Electronically Signed

## 2025-06-01 LAB
ANION GAP SERPL CALCULATED.3IONS-SCNC: 10 MMOL/L (ref 9–16)
BASOPHILS # BLD: 0.05 K/UL (ref 0–0.2)
BASOPHILS NFR BLD: 1 % (ref 0–2)
BUN SERPL-MCNC: 11 MG/DL (ref 8–23)
CALCIUM SERPL-MCNC: 8.2 MG/DL (ref 8.6–10.4)
CHLORIDE SERPL-SCNC: 104 MMOL/L (ref 98–107)
CK SERPL-CCNC: 593 U/L (ref 39–308)
CO2 SERPL-SCNC: 22 MMOL/L (ref 20–31)
CREAT SERPL-MCNC: 0.7 MG/DL (ref 0.7–1.2)
EOSINOPHIL # BLD: 0.66 K/UL (ref 0–0.44)
EOSINOPHILS RELATIVE PERCENT: 6 % (ref 1–4)
ERYTHROCYTE [DISTWIDTH] IN BLOOD BY AUTOMATED COUNT: 13.7 % (ref 11.8–14.4)
FIO2: 30
GFR, ESTIMATED: >90 ML/MIN/1.73M2
GLUCOSE BLD-MCNC: 127 MG/DL (ref 74–100)
GLUCOSE SERPL-MCNC: 118 MG/DL (ref 74–99)
HCT VFR BLD AUTO: 38.8 % (ref 40.7–50.3)
HGB BLD-MCNC: 12.7 G/DL (ref 13–17)
IMM GRANULOCYTES # BLD AUTO: 0.04 K/UL (ref 0–0.3)
IMM GRANULOCYTES NFR BLD: 0 %
LYMPHOCYTES NFR BLD: 1.67 K/UL (ref 1.1–3.7)
LYMPHOCYTES RELATIVE PERCENT: 16 % (ref 24–43)
MAGNESIUM SERPL-MCNC: 2 MG/DL (ref 1.6–2.4)
MCH RBC QN AUTO: 31.2 PG (ref 25.2–33.5)
MCHC RBC AUTO-ENTMCNC: 32.7 G/DL (ref 28.4–34.8)
MCV RBC AUTO: 95.3 FL (ref 82.6–102.9)
MONOCYTES NFR BLD: 1.08 K/UL (ref 0.1–1.2)
MONOCYTES NFR BLD: 10 % (ref 3–12)
NEUTROPHILS NFR BLD: 67 % (ref 36–65)
NEUTS SEG NFR BLD: 7.03 K/UL (ref 1.5–8.1)
NRBC BLD-RTO: 0 PER 100 WBC
O2 DELIVERY DEVICE: ABNORMAL
PLATELET # BLD AUTO: 182 K/UL (ref 138–453)
PMV BLD AUTO: 11.1 FL (ref 8.1–13.5)
POC HCO3: 27 MMOL/L (ref 21–28)
POC O2 SATURATION: 97.7 % (ref 94–98)
POC PCO2: 36.1 MM HG (ref 35–48)
POC PH: 7.48 (ref 7.35–7.45)
POC PO2: 91.6 MM HG (ref 83–108)
POSITIVE BASE EXCESS, ART: 3.5 MMOL/L (ref 0–3)
POTASSIUM SERPL-SCNC: 3.5 MMOL/L (ref 3.7–5.3)
RBC # BLD AUTO: 4.07 M/UL (ref 4.21–5.77)
SODIUM SERPL-SCNC: 136 MMOL/L (ref 136–145)
WBC OTHER # BLD: 10.5 K/UL (ref 3.5–11.3)

## 2025-06-01 PROCEDURE — 2700000000 HC OXYGEN THERAPY PER DAY

## 2025-06-01 PROCEDURE — 2580000003 HC RX 258

## 2025-06-01 PROCEDURE — 82803 BLOOD GASES ANY COMBINATION: CPT

## 2025-06-01 PROCEDURE — 6370000000 HC RX 637 (ALT 250 FOR IP)

## 2025-06-01 PROCEDURE — 2000000000 HC ICU R&B

## 2025-06-01 PROCEDURE — 99291 CRITICAL CARE FIRST HOUR: CPT | Performed by: INTERNAL MEDICINE

## 2025-06-01 PROCEDURE — 6370000000 HC RX 637 (ALT 250 FOR IP): Performed by: PSYCHIATRY & NEUROLOGY

## 2025-06-01 PROCEDURE — 6360000002 HC RX W HCPCS

## 2025-06-01 PROCEDURE — 2500000003 HC RX 250 WO HCPCS

## 2025-06-01 PROCEDURE — 36600 WITHDRAWAL OF ARTERIAL BLOOD: CPT

## 2025-06-01 PROCEDURE — 82550 ASSAY OF CK (CPK): CPT

## 2025-06-01 PROCEDURE — 36415 COLL VENOUS BLD VENIPUNCTURE: CPT

## 2025-06-01 PROCEDURE — 95714 VEEG EA 12-26 HR UNMNTR: CPT

## 2025-06-01 PROCEDURE — 83735 ASSAY OF MAGNESIUM: CPT

## 2025-06-01 PROCEDURE — 99233 SBSQ HOSP IP/OBS HIGH 50: CPT | Performed by: PSYCHIATRY & NEUROLOGY

## 2025-06-01 PROCEDURE — 94003 VENT MGMT INPAT SUBQ DAY: CPT

## 2025-06-01 PROCEDURE — 94761 N-INVAS EAR/PLS OXIMETRY MLT: CPT

## 2025-06-01 PROCEDURE — 80048 BASIC METABOLIC PNL TOTAL CA: CPT

## 2025-06-01 PROCEDURE — 82947 ASSAY GLUCOSE BLOOD QUANT: CPT

## 2025-06-01 PROCEDURE — 95720 EEG PHY/QHP EA INCR W/VEEG: CPT | Performed by: PSYCHIATRY & NEUROLOGY

## 2025-06-01 PROCEDURE — 37799 UNLISTED PX VASCULAR SURGERY: CPT

## 2025-06-01 PROCEDURE — 85025 COMPLETE CBC W/AUTO DIFF WBC: CPT

## 2025-06-01 RX ORDER — MIDODRINE HYDROCHLORIDE 5 MG/1
10 TABLET ORAL
Status: DISCONTINUED | OUTPATIENT
Start: 2025-06-01 | End: 2025-06-03 | Stop reason: HOSPADM

## 2025-06-01 RX ORDER — BROMOCRIPTINE MESYLATE 2.5 MG/1
2.5 TABLET ORAL EVERY 8 HOURS
Status: DISCONTINUED | OUTPATIENT
Start: 2025-06-01 | End: 2025-06-03 | Stop reason: HOSPADM

## 2025-06-01 RX ORDER — FUROSEMIDE 10 MG/ML
20 INJECTION INTRAMUSCULAR; INTRAVENOUS ONCE
Status: COMPLETED | OUTPATIENT
Start: 2025-06-01 | End: 2025-06-01

## 2025-06-01 RX ADMIN — FAMOTIDINE 20 MG: 10 INJECTION, SOLUTION INTRAVENOUS at 20:57

## 2025-06-01 RX ADMIN — BROMOCRIPTINE MESYLATE 2.5 MG: 2.5 TABLET ORAL at 03:01

## 2025-06-01 RX ADMIN — BROMOCRIPTINE MESYLATE 2.5 MG: 2.5 TABLET ORAL at 15:00

## 2025-06-01 RX ADMIN — SODIUM CHLORIDE, SODIUM LACTATE, POTASSIUM CHLORIDE, AND CALCIUM CHLORIDE: .6; .31; .03; .02 INJECTION, SOLUTION INTRAVENOUS at 06:42

## 2025-06-01 RX ADMIN — SODIUM CHLORIDE, SODIUM LACTATE, POTASSIUM CHLORIDE, AND CALCIUM CHLORIDE: .6; .31; .03; .02 INJECTION, SOLUTION INTRAVENOUS at 22:29

## 2025-06-01 RX ADMIN — POLYETHYLENE GLYCOL 3350 17 G: 17 POWDER, FOR SOLUTION ORAL at 08:19

## 2025-06-01 RX ADMIN — MIDODRINE HYDROCHLORIDE 10 MG: 5 TABLET ORAL at 08:18

## 2025-06-01 RX ADMIN — FAMOTIDINE 20 MG: 10 INJECTION, SOLUTION INTRAVENOUS at 08:19

## 2025-06-01 RX ADMIN — Medication 9 MCG/MIN: at 06:41

## 2025-06-01 RX ADMIN — BROMOCRIPTINE MESYLATE 2.5 MG: 2.5 TABLET ORAL at 21:42

## 2025-06-01 RX ADMIN — PROPOFOL 15 MCG/KG/MIN: 10 INJECTION, EMULSION INTRAVENOUS at 05:13

## 2025-06-01 RX ADMIN — SODIUM CHLORIDE, PRESERVATIVE FREE 10 ML: 5 INJECTION INTRAVENOUS at 08:20

## 2025-06-01 RX ADMIN — LEVOTHYROXINE SODIUM 150 MCG: 0.07 TABLET ORAL at 06:37

## 2025-06-01 RX ADMIN — POTASSIUM BICARBONATE 40 MEQ: 782 TABLET, EFFERVESCENT ORAL at 06:39

## 2025-06-01 RX ADMIN — ENOXAPARIN SODIUM 40 MG: 100 INJECTION SUBCUTANEOUS at 08:30

## 2025-06-01 RX ADMIN — ONDANSETRON 4 MG: 2 INJECTION, SOLUTION INTRAMUSCULAR; INTRAVENOUS at 17:12

## 2025-06-01 RX ADMIN — SODIUM CHLORIDE, PRESERVATIVE FREE 10 ML: 5 INJECTION INTRAVENOUS at 21:42

## 2025-06-01 RX ADMIN — FUROSEMIDE 20 MG: 10 INJECTION, SOLUTION INTRAMUSCULAR; INTRAVENOUS at 08:19

## 2025-06-01 RX ADMIN — BROMOCRIPTINE MESYLATE 2.5 MG: 2.5 TABLET ORAL at 08:15

## 2025-06-01 ASSESSMENT — PULMONARY FUNCTION TESTS
PIF_VALUE: 26
PIF_VALUE: 13
PIF_VALUE: 13

## 2025-06-01 ASSESSMENT — PAIN SCALES - GENERAL: PAINLEVEL_OUTOF10: 0

## 2025-06-01 NOTE — CONSULTS
Updated report    Referring physician: Dr. Santillan  Date: 6/1/2025  Start Time: 5/31/2025 @ 1620  End Time:6/1/2025 @ 1620    Indication  Patient with encephalopathy, EEG done to rule out subclinical seizures.       Introduction  This continuous video-EEG was acquired using a [employer] workstation at 256 samples/s. Electrodes were placed according to the International 10-20 system. Automated spike and seizure detection algorithms were applied. Video was recorded during this study.    Description  The background consistent of diffuse  reactive polymorphic delta and theta slowing, at times up to 8 Hz activity seen. No consistent focal slowing or interhemispheric asymmetry was noted. Normal sleep structures were observed. There were occasional discharges seen only in right central/temporal region.         Events  5/30-31/2025: no events reported.     5/31-6/1/2025: no events reported.       Impression  Abnormal continuous vEEG recording, the slowing mentioned above suggests mild non specific encephalopathy.     There was few right central/temporal phase reversal discharges seen. These could be part of drowsiness pattern but could carry epileptic potential and underlying dysfunction. Correlate clinically.    Updated report discussed with managing team.     Shauna Rebollar MD  Epilepsy Board Certified.  Neurology Board Certified.    Electronically Signed

## 2025-06-01 NOTE — PLAN OF CARE
Problem: Respiratory - Adult  Goal: Achieves optimal ventilation and oxygenation  5/31/2025 2016 by Raya Diego RCP  Outcome: Progressing     Problem: OXYGENATION/RESPIRATORY FUNCTION  Goal: Patient will maintain patent airway  Outcome: Ongoing  Goal: Patient will achieve/maintain normal respiratory rate/effort  Respiratory rate and effort will be within normal limits for the patient  Outcome: Ongoing    Problem: MECHANICAL VENTILATION  Goal: Patient will maintain patent airway  Outcome: Ongoing  Goal: Oral health is maintained or improved  Outcome: Ongoing  Goal: ET tube will be managed safely  Outcome: Ongoing  Goal: Ability to express needs and understand communication  Outcome: Ongoing  Goal: Mobility/activity is maintained at optimum level for patient  Outcome: Ongoing    Problem: ASPIRATION PRECAUTIONS  Goal: Patient’s risk of aspiration is minimized  Outcome: Ongoing    Problem: SKIN INTEGRITY  Goal: Skin integrity is maintained or improved  Outcome: Ongoing

## 2025-06-01 NOTE — PLAN OF CARE
Problem: Chronic Conditions and Co-morbidities  Goal: Patient's chronic conditions and co-morbidity symptoms are monitored and maintained or improved  Outcome: Progressing     Problem: Discharge Planning  Goal: Discharge to home or other facility with appropriate resources  Outcome: Progressing     Problem: Safety - Medical Restraint  Goal: Remains free of injury from restraints (Restraint for Interference with Medical Device)  Description: INTERVENTIONS:1. Determine that other, less restrictive measures have been tried or would not be effective before applying the restraint2. Evaluate the patient's condition at the time of restraint application3. Inform patient/family regarding the reason for restraint4. Q2H: Monitor safety, psychosocial status, comfort, nutrition and hydration  Outcome: Progressing    Problem: Pain  Goal: Verbalizes/displays adequate comfort level or baseline comfort level  Outcome: Progressing     Problem: Safety - Adult  Goal: Free from fall injury  Outcome: Progressing     Problem: Nutrition Deficit:  Goal: Optimize nutritional status  Outcome: Progressing     Problem: Respiratory - Adult  Goal: Achieves optimal ventilation and oxygenation  6/1/2025 0654 by Salma Joe RN  Outcome: Progressing    Problem: Skin/Tissue Integrity  Goal: Skin integrity remains intact  Description: 1.  Monitor for areas of redness and/or skin breakdown2.  Assess vascular access sites hourly3.  Every 4-6 hours minimum:  Change oxygen saturation probe site4.  Every 4-6 hours:  If on nasal continuous positive airway pressure, respiratory therapy assess nares and determine need for appliance change or resting period  Outcome: Progressing     Problem: ABCDS Injury Assessment  Goal: Absence of physical injury  Outcome: Progressing

## 2025-06-01 NOTE — PLAN OF CARE
Problem: Respiratory - Adult  Goal: Achieves optimal ventilation and oxygenation  6/1/2025 0714 by Geni James RCP  Outcome: Progressing  6/1/2025 0654 by Salma Joe RN  Outcome: Progressing  5/31/2025 2016 by Raya Diego RCP  Outcome: Progressing

## 2025-06-01 NOTE — PLAN OF CARE
Problem: Safety - Medical Restraint  Goal: Remains free of injury from restraints (Restraint for Interference with Medical Device)  Description: INTERVENTIONS:1. Determine that other, less restrictive measures have been tried or would not be effective before applying the restraint2. Evaluate the patient's condition at the time of restraint application3. Inform patient/family regarding the reason for restraint4. Q2H: Monitor safety, psychosocial status, comfort, nutrition and hydration  6/1/2025 1932 by Jose Clay, RN  Outcome: Completed  Flowsheets  Taken 6/1/2025 1000  Remains free of injury from restraints (restraint for interference with medical device): Every 2 hours: Monitor safety, psychosocial status, comfort, nutrition and hydration  Taken 6/1/2025 0800  Remains free of injury from restraints (restraint for interference with medical device): Every 2 hours: Monitor safety, psychosocial status, comfort, nutrition and hydration

## 2025-06-02 LAB
ANION GAP SERPL CALCULATED.3IONS-SCNC: 7 MMOL/L (ref 9–16)
BASOPHILS # BLD: 0.04 K/UL (ref 0–0.2)
BASOPHILS NFR BLD: 1 % (ref 0–2)
BUN SERPL-MCNC: 13 MG/DL (ref 8–23)
CALCIUM SERPL-MCNC: 8.8 MG/DL (ref 8.6–10.4)
CHLORIDE SERPL-SCNC: 102 MMOL/L (ref 98–107)
CO2 SERPL-SCNC: 27 MMOL/L (ref 20–31)
CREAT SERPL-MCNC: 0.7 MG/DL (ref 0.7–1.2)
EOSINOPHIL # BLD: 0.4 K/UL (ref 0–0.44)
EOSINOPHILS RELATIVE PERCENT: 5 % (ref 1–4)
ERYTHROCYTE [DISTWIDTH] IN BLOOD BY AUTOMATED COUNT: 13.6 % (ref 11.8–14.4)
GFR, ESTIMATED: >90 ML/MIN/1.73M2
GLUCOSE SERPL-MCNC: 100 MG/DL (ref 74–99)
HCT VFR BLD AUTO: 37.4 % (ref 40.7–50.3)
HGB BLD-MCNC: 12.2 G/DL (ref 13–17)
IMM GRANULOCYTES # BLD AUTO: 0.03 K/UL (ref 0–0.3)
IMM GRANULOCYTES NFR BLD: 0 %
LYMPHOCYTES NFR BLD: 0.94 K/UL (ref 1.1–3.7)
LYMPHOCYTES RELATIVE PERCENT: 12 % (ref 24–43)
MAGNESIUM SERPL-MCNC: 2.3 MG/DL (ref 1.6–2.4)
MCH RBC QN AUTO: 31.2 PG (ref 25.2–33.5)
MCHC RBC AUTO-ENTMCNC: 32.6 G/DL (ref 28.4–34.8)
MCV RBC AUTO: 95.7 FL (ref 82.6–102.9)
MONOCYTES NFR BLD: 0.7 K/UL (ref 0.1–1.2)
MONOCYTES NFR BLD: 9 % (ref 3–12)
NEUTROPHILS NFR BLD: 73 % (ref 36–65)
NEUTS SEG NFR BLD: 5.68 K/UL (ref 1.5–8.1)
NRBC BLD-RTO: 0 PER 100 WBC
PLATELET # BLD AUTO: 143 K/UL (ref 138–453)
PMV BLD AUTO: 10.9 FL (ref 8.1–13.5)
POTASSIUM SERPL-SCNC: 4 MMOL/L (ref 3.7–5.3)
RBC # BLD AUTO: 3.91 M/UL (ref 4.21–5.77)
SODIUM SERPL-SCNC: 136 MMOL/L (ref 136–145)
WBC OTHER # BLD: 7.8 K/UL (ref 3.5–11.3)

## 2025-06-02 PROCEDURE — 6370000000 HC RX 637 (ALT 250 FOR IP)

## 2025-06-02 PROCEDURE — 2000000000 HC ICU R&B

## 2025-06-02 PROCEDURE — 97116 GAIT TRAINING THERAPY: CPT

## 2025-06-02 PROCEDURE — 6370000000 HC RX 637 (ALT 250 FOR IP): Performed by: PSYCHIATRY & NEUROLOGY

## 2025-06-02 PROCEDURE — 2580000003 HC RX 258

## 2025-06-02 PROCEDURE — APPSS30 APP SPLIT SHARED TIME 16-30 MINUTES: Performed by: NURSE PRACTITIONER

## 2025-06-02 PROCEDURE — 36415 COLL VENOUS BLD VENIPUNCTURE: CPT

## 2025-06-02 PROCEDURE — 97162 PT EVAL MOD COMPLEX 30 MIN: CPT

## 2025-06-02 PROCEDURE — 80048 BASIC METABOLIC PNL TOTAL CA: CPT

## 2025-06-02 PROCEDURE — 6370000000 HC RX 637 (ALT 250 FOR IP): Performed by: STUDENT IN AN ORGANIZED HEALTH CARE EDUCATION/TRAINING PROGRAM

## 2025-06-02 PROCEDURE — 83735 ASSAY OF MAGNESIUM: CPT

## 2025-06-02 PROCEDURE — 99291 CRITICAL CARE FIRST HOUR: CPT | Performed by: INTERNAL MEDICINE

## 2025-06-02 PROCEDURE — 99223 1ST HOSP IP/OBS HIGH 75: CPT | Performed by: INTERNAL MEDICINE

## 2025-06-02 PROCEDURE — 6360000002 HC RX W HCPCS

## 2025-06-02 PROCEDURE — 93005 ELECTROCARDIOGRAM TRACING: CPT

## 2025-06-02 PROCEDURE — 97530 THERAPEUTIC ACTIVITIES: CPT

## 2025-06-02 PROCEDURE — 99232 SBSQ HOSP IP/OBS MODERATE 35: CPT | Performed by: STUDENT IN AN ORGANIZED HEALTH CARE EDUCATION/TRAINING PROGRAM

## 2025-06-02 PROCEDURE — 85025 COMPLETE CBC W/AUTO DIFF WBC: CPT

## 2025-06-02 PROCEDURE — 2500000003 HC RX 250 WO HCPCS

## 2025-06-02 RX ORDER — DIPHENHYDRAMINE HYDROCHLORIDE, ZINC ACETATE 2; .1 G/100G; G/100G
CREAM TOPICAL 3 TIMES DAILY PRN
Status: DISCONTINUED | OUTPATIENT
Start: 2025-06-02 | End: 2025-06-03 | Stop reason: HOSPADM

## 2025-06-02 RX ORDER — ATROPINE SULFATE 0.1 MG/ML
INJECTION INTRAVENOUS
Status: DISPENSED
Start: 2025-06-02 | End: 2025-06-03

## 2025-06-02 RX ORDER — FUROSEMIDE 40 MG/1
40 TABLET ORAL DAILY
Status: DISCONTINUED | OUTPATIENT
Start: 2025-06-02 | End: 2025-06-03 | Stop reason: HOSPADM

## 2025-06-02 RX ADMIN — MIDODRINE HYDROCHLORIDE 10 MG: 5 TABLET ORAL at 08:27

## 2025-06-02 RX ADMIN — FUROSEMIDE 40 MG: 40 TABLET ORAL at 17:12

## 2025-06-02 RX ADMIN — BROMOCRIPTINE MESYLATE 2.5 MG: 2.5 TABLET ORAL at 16:04

## 2025-06-02 RX ADMIN — MIDODRINE HYDROCHLORIDE 10 MG: 5 TABLET ORAL at 16:01

## 2025-06-02 RX ADMIN — BROMOCRIPTINE MESYLATE 2.5 MG: 2.5 TABLET ORAL at 06:30

## 2025-06-02 RX ADMIN — FAMOTIDINE 20 MG: 10 INJECTION, SOLUTION INTRAVENOUS at 08:28

## 2025-06-02 RX ADMIN — DIPHENHYDRAMINE HYDROCHLORIDE, ZINC ACETATE: 2; .1 CREAM TOPICAL at 20:43

## 2025-06-02 RX ADMIN — LEVOTHYROXINE SODIUM 150 MCG: 0.07 TABLET ORAL at 08:27

## 2025-06-02 RX ADMIN — POLYETHYLENE GLYCOL 3350 17 G: 17 POWDER, FOR SOLUTION ORAL at 16:01

## 2025-06-02 RX ADMIN — BROMOCRIPTINE MESYLATE 2.5 MG: 2.5 TABLET ORAL at 22:13

## 2025-06-02 RX ADMIN — ENOXAPARIN SODIUM 40 MG: 100 INJECTION SUBCUTANEOUS at 08:27

## 2025-06-02 RX ADMIN — SODIUM CHLORIDE, PRESERVATIVE FREE 20 ML: 5 INJECTION INTRAVENOUS at 20:43

## 2025-06-02 RX ADMIN — SODIUM CHLORIDE, PRESERVATIVE FREE 10 ML: 5 INJECTION INTRAVENOUS at 08:28

## 2025-06-02 ASSESSMENT — PAIN SCALES - GENERAL: PAINLEVEL_OUTOF10: 0

## 2025-06-02 NOTE — CARE COORDINATION
Case Management   Daily Progress Note       Patient Name: Fransisco Huddleston                   YOB: 1955  Diagnosis: Altered mental status [R41.82]  Neuroleptic malignant syndrome [G21.0]                       GMLOS: 4.5 days  Length of Stay: 3  days    Anticipated Discharge Date: Two or more days before discharge    Readmission Risk (Low < 19, Mod (19-27), High > 27): Readmission Risk Score: 13.5        Current Transitional Plan    [] Home Independently    [] Home with HC    [] Skilled Nursing Facility    [] Acute Rehabilitation    [] Long Term Acute Care (LTAC)    [x] Other: Home w/ OP PT/OT (at [facility])     Plan for the Stay (Medical Management) :  PT/OT ordered. 2 L NC, Will need OP Sleep Study.         Workflow Continuation (Additional Notes) :  Monitor for Home O2 needs. PT refusing SNF/HC. Would like OP PT/OT orders for [facility].   Plan is home, daughter will transport. Has established PCP.       ADÁN MEYER RN  June 2, 2025

## 2025-06-02 NOTE — CONSULTS
[facility]    Consult / H&P               Today's Date: 6/2/2025  Patient Name: Fransisco Huddleston  Date of admission: 5/30/2025  5:43 AM  Patient's age: 69 y.o., 1955  Admission Dx: Altered mental status [R41.82]  Neuroleptic malignant syndrome [G21.0]    Reason for Consult: Brief Episode of 3rd degree heart block    Requesting Physician: James Rankin MD    CHIEF COMPLAINT: worsening restless leg syndrome    History Obtained From:  patient, EMR    HISTORY OF PRESENT ILLNESS:      The patient is a 69 y.o. male who presents to [place] ED with worsening restless leg syndrome. On pramipexole for 20years, not helping for last 2-3 weeks. Tried alternative meds which did not help, complained of insomnia for 5 days, noted to have pressured speech and a lot of energy. Received multiple doses of ativan and haldol there without improvement. Developed more agitation, fidgeting, diaphoresis, tachycardia and had some dystonia, concern for NMS vs EPS, able to move extremities, dantrolene not initiated, received cogentin. Ativan continued, initiated on precedex, subsequently had multiple apneic episodes and intubated for airway protection and transferred to [facility] MICU for further management.  Neurology evaluated patient and placed him on bromocriptine. Cardiology being consulted for bradycardia with concern for brief episode of 3rd degree block.    On discussion with patient and family at bedside, they did state that his HR is normally in the 50s and there has been noted 1st degree block before. The patient stated that he had an episode of emesis at that time but denied any chest pain, SOB, or dizziness.    4/3/2025 TTE: LVEF 55-60%, abnormal LV diastolic function, mildly dilated RV, MV repaired with annular ring, mid MR, mild TR, RVSP 38mmHg, bioprosthetic AV     PMHx: pAfib, AVR Bioprosthetic(2015), GERD, MR w/ MVR annular ring (2015), restless leg syndrome, HTN, and hypothyroidism    Past Medical

## 2025-06-02 NOTE — FLOWSHEET NOTE
Pt's HR dropped to 25BPM x 9 seconds, then returned to SB 54 bpm. Pt diaphoretic, and vomiting in trash can. Dr Phoenix came to bedside to evaluate, EKG obtained. After vomiting , HR returned to 54 BPM, PTdenied chest pain, SOB or nausea. Refused zofran

## 2025-06-03 ENCOUNTER — APPOINTMENT (OUTPATIENT)
Age: 70
DRG: 091 | End: 2025-06-03
Attending: INTERNAL MEDICINE
Payer: MEDICARE

## 2025-06-03 VITALS
RESPIRATION RATE: 23 BRPM | BODY MASS INDEX: 32.11 KG/M2 | OXYGEN SATURATION: 91 % | HEART RATE: 58 BPM | SYSTOLIC BLOOD PRESSURE: 103 MMHG | DIASTOLIC BLOOD PRESSURE: 54 MMHG | TEMPERATURE: 98.1 F | WEIGHT: 204.59 LBS | HEIGHT: 67 IN

## 2025-06-03 PROBLEM — G93.41 ACUTE METABOLIC ENCEPHALOPATHY: Status: ACTIVE | Noted: 2025-06-03

## 2025-06-03 PROBLEM — R00.1 SINUS BRADYCARDIA: Status: ACTIVE | Noted: 2025-06-03

## 2025-06-03 PROBLEM — R55 VASOVAGAL RESPONSE: Status: ACTIVE | Noted: 2025-06-03

## 2025-06-03 LAB
ANION GAP SERPL CALCULATED.3IONS-SCNC: 8 MMOL/L (ref 9–16)
BASOPHILS # BLD: 0.05 K/UL (ref 0–0.2)
BASOPHILS NFR BLD: 1 % (ref 0–2)
BUN SERPL-MCNC: 12 MG/DL (ref 8–23)
CALCIUM SERPL-MCNC: 8.7 MG/DL (ref 8.6–10.4)
CHLORIDE SERPL-SCNC: 101 MMOL/L (ref 98–107)
CO2 SERPL-SCNC: 27 MMOL/L (ref 20–31)
CREAT SERPL-MCNC: 0.8 MG/DL (ref 0.7–1.2)
EOSINOPHIL # BLD: 0.54 K/UL (ref 0–0.44)
EOSINOPHILS RELATIVE PERCENT: 7 % (ref 1–4)
ERYTHROCYTE [DISTWIDTH] IN BLOOD BY AUTOMATED COUNT: 13.2 % (ref 11.8–14.4)
GFR, ESTIMATED: >90 ML/MIN/1.73M2
GLUCOSE SERPL-MCNC: 89 MG/DL (ref 74–99)
HCT VFR BLD AUTO: 37.3 % (ref 40.7–50.3)
HGB BLD-MCNC: 12.3 G/DL (ref 13–17)
IMM GRANULOCYTES # BLD AUTO: 0.04 K/UL (ref 0–0.3)
IMM GRANULOCYTES NFR BLD: 1 %
LYMPHOCYTES NFR BLD: 0.96 K/UL (ref 1.1–3.7)
LYMPHOCYTES RELATIVE PERCENT: 13 % (ref 24–43)
MCH RBC QN AUTO: 31.1 PG (ref 25.2–33.5)
MCHC RBC AUTO-ENTMCNC: 33 G/DL (ref 28.4–34.8)
MCV RBC AUTO: 94.2 FL (ref 82.6–102.9)
MONOCYTES NFR BLD: 0.74 K/UL (ref 0.1–1.2)
MONOCYTES NFR BLD: 10 % (ref 3–12)
NEUTROPHILS NFR BLD: 68 % (ref 36–65)
NEUTS SEG NFR BLD: 5.03 K/UL (ref 1.5–8.1)
NRBC BLD-RTO: 0 PER 100 WBC
PLATELET # BLD AUTO: 160 K/UL (ref 138–453)
PMV BLD AUTO: 10.9 FL (ref 8.1–13.5)
POTASSIUM SERPL-SCNC: 4 MMOL/L (ref 3.7–5.3)
RBC # BLD AUTO: 3.96 M/UL (ref 4.21–5.77)
SODIUM SERPL-SCNC: 136 MMOL/L (ref 136–145)
WBC OTHER # BLD: 7.4 K/UL (ref 3.5–11.3)

## 2025-06-03 PROCEDURE — 85025 COMPLETE CBC W/AUTO DIFF WBC: CPT

## 2025-06-03 PROCEDURE — 97535 SELF CARE MNGMENT TRAINING: CPT

## 2025-06-03 PROCEDURE — 2500000003 HC RX 250 WO HCPCS

## 2025-06-03 PROCEDURE — 94761 N-INVAS EAR/PLS OXIMETRY MLT: CPT

## 2025-06-03 PROCEDURE — 99232 SBSQ HOSP IP/OBS MODERATE 35: CPT | Performed by: NURSE PRACTITIONER

## 2025-06-03 PROCEDURE — 99239 HOSP IP/OBS DSCHRG MGMT >30: CPT | Performed by: INTERNAL MEDICINE

## 2025-06-03 PROCEDURE — 6360000002 HC RX W HCPCS

## 2025-06-03 PROCEDURE — 97530 THERAPEUTIC ACTIVITIES: CPT

## 2025-06-03 PROCEDURE — 6370000000 HC RX 637 (ALT 250 FOR IP): Performed by: STUDENT IN AN ORGANIZED HEALTH CARE EDUCATION/TRAINING PROGRAM

## 2025-06-03 PROCEDURE — 36415 COLL VENOUS BLD VENIPUNCTURE: CPT

## 2025-06-03 PROCEDURE — 6370000000 HC RX 637 (ALT 250 FOR IP)

## 2025-06-03 PROCEDURE — 97165 OT EVAL LOW COMPLEX 30 MIN: CPT

## 2025-06-03 PROCEDURE — 80048 BASIC METABOLIC PNL TOTAL CA: CPT

## 2025-06-03 PROCEDURE — 99233 SBSQ HOSP IP/OBS HIGH 50: CPT | Performed by: STUDENT IN AN ORGANIZED HEALTH CARE EDUCATION/TRAINING PROGRAM

## 2025-06-03 PROCEDURE — 93225 XTRNL ECG REC<48 HRS REC: CPT

## 2025-06-03 PROCEDURE — 2700000000 HC OXYGEN THERAPY PER DAY

## 2025-06-03 PROCEDURE — 6360000002 HC RX W HCPCS: Performed by: STUDENT IN AN ORGANIZED HEALTH CARE EDUCATION/TRAINING PROGRAM

## 2025-06-03 PROCEDURE — 6370000000 HC RX 637 (ALT 250 FOR IP): Performed by: PSYCHIATRY & NEUROLOGY

## 2025-06-03 RX ORDER — BROMOCRIPTINE MESYLATE 2.5 MG/1
TABLET ORAL
Qty: 6 TABLET | Refills: 0 | Status: SHIPPED | OUTPATIENT
Start: 2025-06-03 | End: 2025-06-03 | Stop reason: HOSPADM

## 2025-06-03 RX ORDER — FUROSEMIDE 10 MG/ML
20 INJECTION INTRAMUSCULAR; INTRAVENOUS ONCE
Status: COMPLETED | OUTPATIENT
Start: 2025-06-03 | End: 2025-06-03

## 2025-06-03 RX ORDER — BROMOCRIPTINE MESYLATE 2.5 MG/1
2.5 TABLET ORAL
Qty: 90 TABLET | Refills: 3 | Status: SHIPPED | OUTPATIENT
Start: 2025-06-03

## 2025-06-03 RX ORDER — MIDODRINE HYDROCHLORIDE 10 MG/1
10 TABLET ORAL
Qty: 90 TABLET | Refills: 3 | Status: SHIPPED | OUTPATIENT
Start: 2025-06-03 | End: 2025-06-03 | Stop reason: HOSPADM

## 2025-06-03 RX ORDER — GABAPENTIN 300 MG/1
300 CAPSULE ORAL NIGHTLY
Qty: 30 CAPSULE | Refills: 2 | Status: SHIPPED | OUTPATIENT
Start: 2025-06-03 | End: 2027-06-10

## 2025-06-03 RX ADMIN — BROMOCRIPTINE MESYLATE 2.5 MG: 2.5 TABLET ORAL at 06:41

## 2025-06-03 RX ADMIN — SODIUM CHLORIDE, PRESERVATIVE FREE 10 ML: 5 INJECTION INTRAVENOUS at 07:50

## 2025-06-03 RX ADMIN — DIPHENHYDRAMINE HYDROCHLORIDE, ZINC ACETATE: 2; .1 CREAM TOPICAL at 10:30

## 2025-06-03 RX ADMIN — BROMOCRIPTINE MESYLATE 2.5 MG: 2.5 TABLET ORAL at 13:48

## 2025-06-03 RX ADMIN — LEVOTHYROXINE SODIUM 150 MCG: 0.07 TABLET ORAL at 06:41

## 2025-06-03 RX ADMIN — FUROSEMIDE 20 MG: 10 INJECTION, SOLUTION INTRAMUSCULAR; INTRAVENOUS at 12:28

## 2025-06-03 RX ADMIN — ENOXAPARIN SODIUM 40 MG: 100 INJECTION SUBCUTANEOUS at 07:43

## 2025-06-03 RX ADMIN — MIDODRINE HYDROCHLORIDE 10 MG: 5 TABLET ORAL at 07:43

## 2025-06-03 RX ADMIN — FUROSEMIDE 40 MG: 40 TABLET ORAL at 07:48

## 2025-06-03 ASSESSMENT — PAIN SCALES - GENERAL: PAINLEVEL_OUTOF10: 0

## 2025-06-03 ASSESSMENT — PULMONARY FUNCTION TESTS: PIF_VALUE: 31

## 2025-06-03 NOTE — PROGRESS NOTES
05/30/25 0843   Care Plan - Respiratory Goals   Achieves optimal ventilation and oxygenation Respiratory therapy support as indicated;Assess and instruct to report shortness of breath or any respiratory difficulty;Assess the need for suctioning and aspirate as needed;Encourage broncho-pulmonary hygiene including cough, deep breathe, incentive spirometry;Oxygen supplementation based on oxygen saturation or arterial blood gases;Position to facilitate oxygenation and minimize respiratory effort;Assess for changes in mentation and behavior;Assess for changes in respiratory status       
  [facility] Neurology Specialist  [address]  [place] [address]  PH:  [phone] or [phone]  FAX:  [phone]            Brief history: Fransisco Huddleston is a 69 y.o. old male admitted on 5/30/2025. Mr. Huddleston was transferred to [facility] ICU on 5/30/2025 after initially presenting to the [place] ED with profound agitation, insomnia, and marked behavioral disturbances. Per his wife, he had not slept in nearly six days and was pacing, engaging in repetitive actions, demonstrating pressured speech, and appearing excessively energized. Notably, he was also hallucinating--reporting content involving software and hard drives--raising concern for an acute delirium versus emerging psychosis. Despite taking pramipexole 1 mg for decades for his RLS, the efficacy had recently declined. He had trialed diazepam, THC, and Valium without benefit.    In the ED, he received multiple doses of lorazepam without relief and later haloperidol, which worsened his agitation. A dystonic reaction was suspected, prompting administration of diphenhydramine and benztropine. Soon thereafter, he developed diaphoresis, dystonia, tachycardia, altered sensorium, and apneic spells requiring intubation. The clinical scenario raised concern for neuroleptic malignant syndrome, though overt rigidity was not observed. Dantrolene was withheld; instead, bromocriptine 2.5 mg q6h was initiated and continued throughout his ICU stay. He was managed on fentanyl and propofol infusions. Subsequently, he was transitioned to CPAP mode and demonstrated clinical improvement in mental status, intermittently following commands, with purposeful limb withdrawal.    He underwent a continuous video EEG from 5/31 to 6/1/2025 due to ongoing encephalopathy and concern for subclinical seizures. The study showed diffuse polymorphic slowing without epileptiform discharges or seizures. MRI brain showed chronic microvascular ischemic changes 
  Neurology Nurse Practitioner Progress Note      INTERVAL HISTORY: This is a 69 y.o.  male admitted 5/30/2025 with concern for neuroleptic malignant syndrome. This is a follow-up neurology progress note. The patient was examined and the chart was reviewed. Discussed with the RN. There were no acute events overnight. No new motor, sensory, visual or bulbar symptoms. He stays intubated, is on fentanyl infusion; propofol was stopped this morning. Has been on CPAP. Has been following simple commands and nodding head appropriately.    HPI: Fransisco Huddleston is a 69 y.o. male with H/O severe chronic RLS, s/p prostatic AV & MV (2015), HTN, HLD, CHF, who was admitted as a transfer from [facility] ED on 5/30/2025 with concern for neuroleptic malignant syndrome.  Patient has had significant history of severe chronic restless leg syndrome for the past 3 decades.  He has been on different medications over the years that only helped temporarily, including Valium and THC; he was on Mirapex for a long time.  Wife reported that patient had not slept in 6 days and 5 nights prior to arrival.  He would start walking in the house or doing odd things repetitively.  He was hallucinating and talking about software and hard drives.  Patient reported feeling lots of energy.  He would take brief naps during daytime and feel energetic.  He had been unable to sit or lay down calmly.  Family called the PCP who advised to go to ER so he could get something to help him sleep.  Patient was taken to [facility] ED on 5/29/2025.      Patient was unable to sit or lay down still for EKG.  Received Ativan with no help.  He then received Haldol, after which he became more jittery.  Benadryl was given with concern for possibility of dystonic reaction.  He got diaphoretic and altered.  Another dose of Ativan was given.  Case was discussed with neurology; it was recommended to continue Ativan and start Precedex.  He also received further doses of 
  Neurology Nurse Practitioner Progress Note      INTERVAL HISTORY: This is a 69 y.o.  male admitted 5/30/2025 with concern for neuroleptic malignant syndrome. This is a follow-up neurology progress note. The patient was examined and the chart was reviewed. Discussed with the patient & RN. There were no acute events overnight. No new motor, sensory, visual or bulbar symptoms. He was A&Ox3, aware of the situation. Ready to be D/C'd home. Wife at bed side; all questions were answered in detail.    HPI: Fransisco Huddleston is a 69 y.o. male with H/O severe chronic RLS, s/p prostatic AV & MV (2015), HTN, HLD, CHF, who was admitted as a transfer from [facility] ED on 5/30/2025 with concern for neuroleptic malignant syndrome.  Patient has had significant history of severe chronic restless leg syndrome for the past 3 decades.  He has been on different medications over the years that only helped temporarily, including Valium and THC; he was on Mirapex for a long time.  Wife reported that patient had not slept in 6 days and 5 nights prior to arrival.  He would start walking in the house or doing odd things repetitively.  He was hallucinating and talking about software and hard drives.  Patient reported feeling lots of energy.  He would take brief naps during daytime and feel energetic.  He had been unable to sit or lay down calmly.  Family called the PCP who advised to go to ER so he could get something to help him sleep.  Patient was taken to [facility] ED on 5/29/2025.      Patient was unable to sit or lay down still for EKG.  Received Ativan with no help.  He then received Haldol, after which he became more jittery.  Benadryl was given with concern for possibility of dystonic reaction.  He got diaphoretic and altered.  Another dose of Ativan was given.  Case was discussed with neurology; it was recommended to continue Ativan and start Precedex.  He also received further doses of Benadryl and Cogentin.  Ativan did not 
Comprehensive Nutrition Assessment    Type and Reason for Visit:  Initial    Nutrition Recommendations/Plan:   Recommend initiating TF once able: Standard w/ fiber (Jevity 1.5) @ 10 mL/hr, advance by 10 mL Q8 hrs until goal rate, 40 mL/hr achieved. Add Pro Mod 1x daily. Flush 30 mL Q4 hrs. Provides 1584 kcals, 97 g PRO, 909 mL fluid.   Will monitor labs, weights, TF tolerance, GI status, and plan of care.      Malnutrition Assessment:  Malnutrition Status:  Insufficient data (05/30/25 1152)    Context:  Acute Illness     Findings of the 6 clinical characteristics of malnutrition:  Energy Intake:  No decrease in energy intake  Weight Loss:  No weight loss     Body Fat Loss:  Unable to assess     Muscle Mass Loss:  Unable to assess    Fluid Accumulation:  Mild     Strength:  Not Performed    Nutrition Assessment:    70 yo male admitted d/t restless leg syndrome and altered mental status. PMH: HTN, heart faliure, hypothyroidisim. Diet NPO. Assessed d/t ventilaltor. Pt I/S. OG in place. Propofol running at 15.4 mL/hr providing 406 kcals. #. Family reports no recent wt or appetite changes. Recommend initiating Standard w/ fiber (Jevity 1.5) @ 10 mL/hr, advance by 10 mL Q8 hrs until goal rate, 40 mL/hr achieved. Add Pro Mod 1x daily. Flush 30 mL Q4 hrs. Provides 1584 kcals, 97 g PRO, 909 mL fluid. Pt recieving 1990 kcals total w/ propofol.  RD will monitor for initation of nutrition.    Nutrition Related Findings:    Edema: +1 non-pitting BLE. Labs/ Meds: Reviewed. Wound Type: None       Current Nutrition Intake & Therapies:    Average Meal Intake: NPO  Average Supplements Intake: NPO  Diet NPO    Anthropometric Measures:  Height: 170.2 cm (5' 7.01\")  Ideal Body Weight (IBW): 148 lbs (67 kg)    Current Body Weight: 85.8 kg (189 lb 2.5 oz), 127.8 % IBW. Weight Source: Bed scale  Current BMI (kg/m2): 29.6  Weight Adjustment For: No Adjustment  BMI Categories: Overweight (BMI 25.0-29.9)    Estimated Daily Nutrient 
Comprehensive Nutrition Assessment    Type and Reason for Visit:  Reassess    Nutrition Recommendations/Plan:   Continue current diet.  Encourage intakes.  Monitor need for oral supplements.  Will monitor labs, weights, and plan of care.     Malnutrition Assessment:  Malnutrition Status:  At risk for malnutrition (06/02/25 1520)    Context:  Acute Illness     Findings of the 6 clinical characteristics of malnutrition:  Energy Intake:  Mild decrease in energy intake  Weight Loss:  No weight loss     Body Fat Loss:  No body fat loss    Muscle Mass Loss:  No muscle mass loss  Fluid Accumulation:  Mild Extremities   Strength:  Not Performed    Nutrition Assessment:    Pt extubated yesterday.  Pt eating breakfast at visit - had consumed more than 50% of his breakfast today.  Pt also taking fluids well.  Will monitor need for oral supplements.  Weight fluctuations noted - ? due to fluids/edema.  Labs/Meds reviewed.    Nutrition Related Findings:    Labs/Meds reviewed.  Hypoactive bowel sounds.  +1 extremity edema. Wound Type: None       Current Nutrition Intake & Therapies:    Average Meal Intake: 51-75%  Average Supplements Intake: None Ordered  ADULT DIET; Regular; Low Sodium (2 gm); 1800 ml    Anthropometric Measures:  Height: 170.2 cm (5' 7.01\")  Ideal Body Weight (IBW): 148 lbs (67 kg)    Admission Body Weight: 85.8 kg (189 lb 2.5 oz)  Current Body Weight: 92.8 kg (204 lb 9.4 oz), 138.2 % IBW. Weight Source: Bed scale  Current BMI (kg/m2): 32           Weight Adjustment For: No Adjustment                 BMI Categories: Obese Class 1 (BMI 30.0-34.9)    Estimated Daily Nutrient Needs:  Energy Requirements Based On: Formula  Weight Used for Energy Requirements: Admission  Energy (kcal/day): 5660-7158 kcals/day  Weight Used for Protein Requirements: Ideal  Protein (g/day):  gm pro/day  Method Used for Fluid Requirements: 1 ml/kcal  Fluid (ml/day): or per physician    Nutrition Diagnosis:   Inadequate oral 
Critical Care Team - Daily Progress Note      Date and time: 5/31/2025 9:11 AM  Patient's name:  Fransisco Huddleston  Medical Record Number: [id number]  Patient's account/billing number: [id number]  Patient's YOB: 1955  Age: 69 y.o.  Date of Admission: 5/30/2025  5:43 AM  Length of stay during current admission: 1      Primary Care Physician: Ritesh Brush MD  ICU Attending Physician:      Code Status: Full Code    Reason for ICU admission: No chief complaint on file.      Subjective:     OVERNIGHT EVENTS:       overnight, hypotensive, received fluid bolus, improved     Patient seen and examined bedside this morning  Intubated and sedated  Vent 16/530/30/5  ABG this morning  7.51/29.7/23.7  Sedated with propofol at 20 mcg and fentanyl 50 mcg  On pressure support Levophed at 7 mcg    On LTME    Urine output in the last 24 hours 700 mL    Labs this morning reviewed   CK and myoglobin down trending  MRI done yesterday chronic microvascular changes    Feeding Diet: tube feeds @ 30 ml/hr, goal 40  Fluids -200 cc/hr LR  Family not updated  Analgesic fentanyl   Sedation  propofol  Thrombo-prophylaxis Lovenox  Mobility minimal  Heads up 30 Degrees  Ulcer prophylaxis  Pepcid  Glycemic control well controlled without any sliding scale  Spontaneous breathing trial none  Bowel regimen/urine output Glycolax PRN urine output Low  Indwelling catheter/lines-peripheral IV 2, Lima catheter 5/29, NG 5/30    de-escalation  none       AWAKE & FOLLOWING COMMANDS:  [x] No   [] Yes    CURRENT VENTILATION STATUS:     [x] Ventilator  [] BIPAP  [] Nasal Cannula [] Room Air      IF INTUBATED, ET TUBE MARKING AT LOWER LIP:       cms    SECRETIONS Amount:  [] Small [] Moderate  [] Large  [] None  Color:     [] White [] Colored  [] Bloody    SEDATION:  RAAS Score: fentanyl  [x] Propofol gtt  [] Versed gtt  [] Ativan gtt   [] No Sedation    PARALYZED:  [x] No    [] Yes    DIARRHEA:                [x] No                [] Yes  (C. 
Critical Care Team - Daily Progress Note      Date and time: 6/3/2025 7:34 AM  Patient's name:  Fransisco Huddleston  Medical Record Number: [id number]  Patient's account/billing number: [id number]  Patient's YOB: 1955  Age: 69 y.o.  Date of Admission: 5/30/2025  5:43 AM  Length of stay during current admission: 4      Primary Care Physician: Ritesh Brush MD  ICU Attending Physician: Dr. Hagan    Code Status: Full Code    Reason for ICU admission: No chief complaint on file.      Subjective:   Interval History:  Plan for downgrade yesterday, however while working with PT, had episode of dizziness, vomiting, bradycardia, concern for AV block on telemetry. Cards consulted. Kept ICU for monitoring     F.A.S.T. M. H.U.G.S. B.I.D.    Feeding Diet: ADULT DIET; Regular; Low Sodium (2 gm); 1800 ml  Fluids:Noinw  Family: connie maradiaga  Analgesic: Tylenol PRN  Sedation: None  Thrombo-prophylaxis: [x] Enoxaparin, [] Unfract. Heparin Subcutaneously, [] EPC Cuffs  Mobility: Bedrest   Heads up: Elevated   Ulcer prophylaxis: [x] PPI Agent, [] Q9Qckbl, [] Sucralfate, [] Other:  Glycemic control: Good  Spontaneous breathing trial: N/A  Bowel regimen/urine output: PRN  Indwelling catheter/lines: PIV x2    Brief History  69-year-old male with past medical history significant for  Paroxysmal atrial fibrillation  Aortic regurgitation  GERD  Mitral valve regurgitation  Restless leg syndrome on pramipexole for the past 20 years  Hypothyroidism  Presented initially to [place] ER with chief complaints of worsening restless leg syndrome.  Patient has been on pramipexole for the past 20 years but said that the medication for the past 2 to 3 weeks was not helping him, he tried different medications including diazepam, THC, Valium.  And he had not slept for the past 5 days, had pressured speech and a lot of energy.  It appears that ED he received multiple dose of Ativan and then Haldol without any improvement.  After Haldol he 
[facility]   Progress Note                   Date:   6/3/2025  Patient name: Fransisco Huddleston  Date of admission:  5/30/2025  5:43 AM  MRN:   [id number]  YOB: 1955  PCP: Ritesh Brush MD      Subjective:       Patient seen and examined at bedside.  Overnight events noted.  Doing well from the cardiac standpoint, denies any chest pain, shortness of breath.    Medications:   Scheduled Meds:   furosemide  40 mg Oral Daily    midodrine  10 mg Oral TID WC    bromocriptine  2.5 mg Oral Q8H    sodium chloride flush  5-40 mL IntraVENous 2 times per day    enoxaparin  40 mg SubCUTAneous Daily    levothyroxine  150 mcg Orogastric Daily     Continuous Infusions:   sodium chloride           CBC:   Recent Labs     06/01/25 0444 06/02/25 0418 06/03/25  0554   WBC 10.5 7.8 7.4   HGB 12.7* 12.2* 12.3*    143 160     BMP:    Recent Labs     06/01/25 0444 06/02/25  0418 06/03/25  0554    136 136   K 3.5* 4.0 4.0    102 101   CO2 22 27 27   BUN 11 13 12   CREATININE 0.7 0.7 0.8   GLUCOSE 118* 100* 89     Hepatic:   Recent Labs     05/31/25  0823   AST 52*   ALT 44   BILITOT 0.8   ALKPHOS 66       Objective:   Vitals: BP (!) 121/51   Pulse 51   Temp 97.9 °F (36.6 °C) (Oral)   Resp 12   Ht 1.702 m (5' 7.01\")   Wt 92.8 kg (204 lb 9.4 oz)   SpO2 93%   BMI 32.04 kg/m²       General appearance: alert and cooperative with exam  HEENT: Normocephalic, atraumatic   Neck: no carotid bruit, no JVD, trachea midline and thyroid not enlarged  Lungs: clear to auscultation bilaterally  Heart: regular rate and rhythm, S1, S2 normal, no murmur  Abdomen: soft, bowel sounds normal  Extremities: no edema or swelling       EKG:   Results for orders placed or performed during the hospital encounter of 05/30/25   EKG 12 Lead   Result Value Ref Range    Ventricular Rate 52 BPM    Atrial Rate 52 BPM    P-R Interval 300 ms    QRS Duration 96 ms    Q-T Interval 430 ms    QTc Calculation (Bazett) 399 ms 
NEUROLOGY INPATIENT PROGRESS NOTE    6/2/2025         Current Exam:     Chart reviewed. Discussed with RN. Patient is doing well, overall feels nearly back to his baseline. He does continue with generalized weakness, PT evaluation planned for today.         Brief History:    Fransisco Huddleston is a  69 y.o. male with H/O severe chronic RLS, AVR and MVR in 2015, HTN, HLD, CHF, who was admitted as a transfer from [place] ED on 5/30/2025 with concern for neuroleptic malignant syndrome.  The patient does have a significant history of severe chronic restless leg syndrome for the past 30 years.  He has been trialed on different medications over the years including Valium, THC, Sinemet, and Mirapex.  Wife reported patient had not slept the prior 5 nights before arrival to the hospital.  At home she reports he was doing odd things repetitively, hallucinating.  He reported feeling very energetic during the day, taking only brief naps with the inability to sit or lie down.  Family called the PCP who advised patient to go to the ER to get assistance with sleep.  On arrival to the ED he was unable to sit still for an EKG; he received Ativan with no help, later received Haldol after which he became more jittery.  Benadryl was given with concern of possible dystonic reaction and he became diaphoretic and altered and another dose of Ativan was given.  The case was discussed with neurology who recommended to continue Ativan and also start Precedex.  He was intubated in the ED, sedated with Versed and propofol infusions and transferred to [facility] for ICU admission.  Neurology was consulted for further evaluation.  CT head with no acute intracranial abnormality, MRI brain with no acute stroke and evidence of chronic microvascular ischemic changes.  LTME was started showing mild to moderate encephalopathy with no epileptiform discharges identified.  He was started on bromocriptine.  He was extubated and was back to his baseline with no 
Occupational Therapy  Occupational Therapy Initial Evaluation  [facility]   Patient Name: Fransisco Huddleston        MRN: [id number]    : 1955    Date of Service: 6/3/2025    No chief complaint on file.    Past Medical History:  has a past medical history of A-fib (HCC), Aortic regurgitation, Fatigue, GERD (gastroesophageal reflux disease), MR (mitral regurgitation), PONV (postoperative nausea and vomiting), Restless leg, SOB (shortness of breath), Thyroid disease, and Wears glasses.  Past Surgical History:  has a past surgical history that includes shoulder surgery (Bilateral); Tonsillectomy; Aortic valve replacement (8/19/15); Mitral valve surgery (8/19/15); and Mitral valve surgery (8/19/15).    Discharge Recommendations  Discharge Recommendations: Patient would benefit from continued therapy after discharge  OT Equipment Recommendations  Equipment Needed: Yes  Mobility Devices: Walker, ADL Assistive Devices  Walker: Rolling  ADL Assistive Devices: Shower Chair with back, Toileting - Raised Toilet Seat with arms    Assessment  Performance deficits / Impairments: Decreased functional mobility ;Decreased endurance;Decreased ADL status;Decreased high-level IADLs;Decreased balance  Prognosis: Good  Decision Making: Low Complexity  REQUIRES OT FOLLOW-UP: Yes  Activity Tolerance  Activity Tolerance: Patient Tolerated treatment well;Patient limited by fatigue  Safety Devices  Type of Devices: Gait belt;Left in chair;Nurse notified;Call light within reach  Restraints  Restraints Initially in Place: No    AM-PAC  AM-PAC Daily Activity - Inpatient   How much help is needed for putting on and taking off regular lower body clothing?: A Little  How much help is needed for bathing (which includes washing, rinsing, drying)?: A Little  How much help is needed for toileting (which includes using toilet, bedpan, or urinal)?: A Little  How much help is needed for putting on and taking off regular upper body 
Order obtained for extubation.  SpO2 of 100 on 30% FiO2.   Patient extubated and placed on 3 liters/min via nasal cannula.   Post extubation SpO2 is 96% with HR  71 bpm and RR 18 breaths/min.    Patient had strong cough that was non-productive.  Extubation Well tolerated by patient..   Breath Sounds: clear    RONALD MÉNDEZ RCP   10:18 AM  
Physical Therapy        Physical Therapy Cancel Note      DATE: 6/3/2025    NAME: Fransisoc Huddleston  MRN: [id number]   : 1955      Patient not seen this date for Physical Therapy due to:    Other: Per RN report, patient discharging home with wife and outpatient PT. Worked with OT this AM and feels comfortable going home.     Will check back as able.     Electronically signed by Sarah Soler PTA on 6/3/2025 at 3:17 PM      
Physical Therapy  Facility/Department: [facility] 3- [facility]   Physical Therapy Initial Evaluation    Patient Name: Fransisco Huddleston        MRN: [id number]    : 1955    Date of Service: 2025    No chief complaint on file.    Past Medical History:  has a past medical history of A-fib (HCC), Aortic regurgitation, Fatigue, GERD (gastroesophageal reflux disease), MR (mitral regurgitation), PONV (postoperative nausea and vomiting), Restless leg, SOB (shortness of breath), Thyroid disease, and Wears glasses.  Past Surgical History:  has a past surgical history that includes shoulder surgery (Bilateral); Tonsillectomy; Aortic valve replacement (8/19/15); Mitral valve surgery (8/19/15); and Mitral valve surgery (8/19/15).    Discharge Recommendations   Further therapy recommended at discharge.  PT Equipment Recommendations  Equipment Needed: Yes  Mobility Devices: Walker  Walker: Rolling  Other: pt. unsafe to ambulate any distance without assistance    Assessment  Body Structures, Functions, Activity Limitations Requiring Skilled Therapeutic Intervention: Decreased functional mobility , Decreased ADL status, Decreased safe awareness, Decreased endurance, Decreased balance, Decreased posture  Assessment: Pt completed sit to stand transfer with Alfonso, RW, and mod verbal cues. Pt required Alfonso to ambulate 50'x2 with RW requiring mod verbal cues for safety and gait mechanics. Pt is not currently at independent [facility]. Pt would benefit from continued acute care physical therapy to address these deficits.  Therapy Prognosis: Good  Decision Making: Medium Complexity  Requires PT Follow-Up: Yes  Activity Tolerance  Activity Tolerance: Patient tolerated treatment well  Safety Devices  Type of Devices: All fall risk precautions in place, Gait belt, Left in chair, Nurse notified, Call light within reach  Restraints  Restraints Initially in Place: No    AM-PAC  AM-PAC Basic Mobility - Inpatient   How much help is needed turning from your 
Pt is on LTME with MRI safe wires  
RT Evaluation for Home Oxygen    Resting (Room Air):  SpO2:  95  HR:  56      Resting (On O2):  SpO2:   95  HR:  53  O2 Device:  nasal canula  O2 Flow Rate (L/min):  2  FIO2 (%):  28  Comments:  resting in chair    During Walk (Room Air):  SpO2:  88  HR:  58  Walk/Assistance Device:  walker  Comments:  patient had no complaints about dyspnea    During Walk (On O2):  SpO2:  97  HR:  66  O2 Device:  nasal canula  O2 Flow Rate (L/min):  2   Walk/Assistance Device:  walker        After Walk:  SpO2:  95  HR:  59  O2 Device:  nasal canula  O2 Flow Rate (L/min):  2  Does the Patient Qualify for Home O2:  Yes          Electronically signed by Fransisco Valderrama RCP on 6/3/2025 at 12:36 PM      
\"LIPASE\", \"AMMONIA\" in the last 72 hours.    Last 3 Blood Glucose:   Recent Labs     25  0749 25  0823 25  0444 25  0418   GLUCOSE 102* 104* 118* 100*      HgBA1c:    Lab Results   Component Value Date/Time    LABA1C 5.1 2025 06:33 AM         TSH:    Lab Results   Component Value Date/Time    TSH 0.80 2025 06:33 AM     ANEMIA STUDIES  Recent Labs     25  0823   TIBC 221*   FERRITIN 321                   ASSESSMENT:     Principal Problem:    Altered mental status  Active Problems:    Moderate to severe pulmonary hypertension (HCC)    Paroxysmal A-fib (HCC)    Hypertensive heart disease with chronic diastolic congestive heart failure (HCC)    Hypothyroidism    Status post aortic valve replacement    S/P MVR (mitral valve repair)    Neuroleptic malignant syndrome    Toxic metabolic encephalopathy    Sleep deprivation    RLS (restless legs syndrome)  Resolved Problems:    * No resolved hospital problems. *      This is a 69 y.o. male with male admitted with worsening restless leg symptoms subsequently got intubated for agitation and restlessness.  Admitted to ICU for the concern of neuroleptic malignant syndrome    PLAN:       PLAN/MEDICAL DECISION MAKING:    NEUROLOGIC:  - Dx: Acute encephalopathy           restless leg syndrome           Concern for neuroleptic malignant syndrome  - Mental status: AOx4  - Imaging: MRI with without contrast showed no acute changes  - Sedation: -None  - Pain control: Tylenol PRN  - Neurology on board  - continue bromocriptine weaning as able.     CARDIOVASCULAR:  BP Range: Systolic (24hrs), Av , Min:88 , Max:142     Diastolic (24hrs), Av, Min:39, Max:68    Pulse Range: Pulse  Av.8  Min: 48  Max: 83  - Dx: Paroxysmal A-fib           Prosthetic aortic and mitral valve           Heart failure with preserved ejection fraction           Essential hypertension           Dyslipidemia  - Echo: Last echo in 2025 showed EF 55 to 60% 
Results   Component Value Date/Time    LABA1C 5.1 2025 06:33 AM         TSH:    Lab Results   Component Value Date/Time    TSH 0.80 2025 06:33 AM     [facility] STUDIES  Recent Labs     25  0823   TIBC 221*   FERRITIN 321         Cultures during this admission:     Blood cultures:                 [] None drawn      [] Negative             []  Positive (Details:  )  Urine Culture:                   [] None drawn      [] Negative             []  Positive (Details:  )  Sputum Culture:               [] None drawn       [] Negative             []  Positive (Details:  )   Endotracheal aspirate:     [] None drawn       [] Negative             []  Positive (Details:  )              ASSESSMENT:     Principal Problem:    Altered mental status  Active Problems:    Moderate to severe pulmonary hypertension (HCC)    Paroxysmal A-fib (HCC)    Hypertensive heart disease with chronic diastolic congestive heart failure (HCC)    Hypothyroidism    Status post aortic valve replacement    S/P MVR (mitral valve repair)    Neuroleptic malignant syndrome    Toxic metabolic encephalopathy    Sleep deprivation    RLS (restless legs syndrome)  Resolved Problems:    * No resolved hospital problems. *      This is a 69 y.o. male with male admitted with worsening restless leg symptoms subsequently got intubated for agitation and restlessness.  Admitted to ICU for the concern of neuroleptic malignant syndrome    PLAN:       PLAN/MEDICAL DECISION MAKING:    NEUROLOGIC:  - Dx: Acute encephalopathy           restless leg syndrome           Concern for neuroleptic malignant syndrome  - Mental status: Intubated and sedated  - Imaging: MRI with without contrast showed no acute changes  - Sedation: -  - Pain control: Fentanyl 50  - Neurology on board    CARDIOVASCULAR:  BP Range: Systolic (24hrs), Av , Min:87 , Max:159     Diastolic (24hrs), Av, Min:39, Max:76    Pulse Range: Pulse  Av.3  Min: 50  Max: 86  - Dx: Paroxysmal

## 2025-06-03 NOTE — PLAN OF CARE
Problem: Discharge Planning  Goal: Discharge to home or other facility with appropriate resources  6/3/2025 1259 by Ekaterina Amado, RN  Outcome: Completed     Problem: Pain  Goal: Verbalizes/displays adequate comfort level or baseline comfort level  6/3/2025 1259 by Ekaterina Amado, RN  Outcome: Completed     Problem: Safety - Adult  Goal: Free from fall injury  6/3/2025 1259 by Ekaterina Amado, RN  Outcome: Completed     Problem: Respiratory - Adult  Goal: Achieves optimal ventilation and oxygenation  6/3/2025 1259 by Ekaterina Amado, RN  Outcome: Adequate for Discharge

## 2025-06-03 NOTE — DISCHARGE INSTR - MEDS
You can continue home medications. You have been given a prescription for bromocriptine. You will need to take this medication 2 times per day for 2 days then 1 time per day for 2 days.

## 2025-06-03 NOTE — FLOWSHEET NOTE
O2 sat dropped to 84% when oxygen per NC was off x 5 minutes. Oxygen NC reapplied at 2 l/min and O2 sats came up to 94%. Pt denies shortness of breath

## 2025-06-03 NOTE — CARE COORDINATION
Case Management   Daily Progress Note       Patient Name: Fransisco Huddleston                   YOB: 1955  Diagnosis: Altered mental status [R41.82]  Neuroleptic malignant syndrome [G21.0]                       GMLOS: 4.5 days  Length of Stay: 4  days    Anticipated Discharge Date: Two or more days before discharge    Readmission Risk (Low < 19, Mod (19-27), High > 27): Readmission Risk Score: 10.3        Current Transitional Plan    [x] Home Independently    [] Home with HC    [] Skilled Nursing Facility    [] Acute Rehabilitation    [] Long Term Acute Care (LTAC)    [] Other:     Plan for the Stay (Medical Management) :  Home w/ OP external referrals for PT/OT. Pt asking for this- wants to go to [facility] for PT/OT.   Pt refusing SNF/HC. Plans are home w/ wife.   Pt qualifies for Home O2 - referral, order, F2F, facesheet and progress note faxed to [facility].   DME order for rolling walker faxed to [facility], Medicare does not cover shower chair. - informed pt and pt's wife of this. They will purchase this on their own.       Workflow Continuation (Additional Notes) :  Home independently, has transportation and established PCP  Provided pt w/ Home O2 Travel tank and instructed pt and pt's wife on use. Both verbalized understanding. Instructed them to call [facility] when they get home for delivery of Home O2 concentrator. Placed this in DC instructions.       ADÁN MEYER RN  Kat 3, 2025

## 2025-06-03 NOTE — PLAN OF CARE
Patient was evaluated today for the diagnosis of CHF.  I entered a DME order for home oxygen at 2 lpm because the diagnosis and testing require the patient to have supplemental oxygen.  Condition will improve or be benefited by oxygen use.  The patient is  able to perform good mobility in a home setting and therefore does require the use of a portable oxygen system.  The need for this equipment was discussed with the patient and he understands and is in agreement.    Flavio Phoenix DO  06/03/25  12:02 PM

## 2025-06-03 NOTE — PLAN OF CARE
Problem: Chronic Conditions and Co-morbidities  Goal: Patient's chronic conditions and co-morbidity symptoms are monitored and maintained or improved  Outcome: Progressing  Flowsheets (Taken 6/2/2025 2000)  Care Plan - Patient's Chronic Conditions and Co-Morbidity Symptoms are Monitored and Maintained or Improved: Monitor and assess patient's chronic conditions and comorbid symptoms for stability, deterioration, or improvement     Problem: Discharge Planning  Goal: Discharge to home or other facility with appropriate resources  6/3/2025 0216 by Xiao Durbin RN  Outcome: Progressing  Flowsheets (Taken 6/2/2025 2000)  Discharge to home or other facility with appropriate resources: Identify barriers to discharge with patient and caregiver  6/2/2025 1942 by Ekaterina Amado RN  Outcome: Progressing     Problem: Pain  Goal: Verbalizes/displays adequate comfort level or baseline comfort level  6/3/2025 0216 by Xiao Durbin RN  Outcome: Progressing  6/2/2025 1942 by Ekaterina Amado RN  Outcome: Progressing     Problem: Safety - Adult  Goal: Free from fall injury  6/3/2025 0216 by Xiao Durbin RN  Outcome: Progressing  6/2/2025 1942 by Ekaterina Amado RN  Outcome: Progressing     Problem: Nutrition Deficit:  Goal: Optimize nutritional status  Outcome: Progressing  Flowsheets (Taken 6/2/2025 1514 by Niurka Her, RD, LD)  Nutrient intake appropriate for improving, restoring, or maintaining nutritional needs:   Assess nutritional status and recommend course of action   Monitor oral intake, labs, and treatment plans   Recommend appropriate diets, oral nutritional supplements, and vitamin/mineral supplements     Problem: Respiratory - Adult  Goal: Achieves optimal ventilation and oxygenation  6/3/2025 0216 by Xiao Durbin RN  Outcome: Progressing  Flowsheets (Taken 6/2/2025 2000)  Achieves optimal ventilation and oxygenation: Assess for changes in respiratory status  6/2/2025 1942 by

## 2025-06-03 NOTE — DISCHARGE INSTR - ACTIVITY
You have been given a referral for outpatient physical and occupational therapy. Also given prescriptions for a walker, shower chair, and home oxygen. Please advance your activity as you are able, but be aware that you might not be able function at the level you used to right after discharge. Should you feel dizzy, short of breath, or otherwise unwell while doing activities, please stop and rest.

## 2025-06-03 NOTE — PLAN OF CARE
Fransisco Huddleston was evaluated today and a DME order was entered for a folding walker with wheels and shower chair because he requires this to successfully complete daily living tasks of bathing and ambulating   A standard walker is necessary due to the patient's impaired ambulation and mobility restrictions and he can ambulate only by using a walker instead of a lesser assistive device, such as a cane or crutch.  The need for this equipment was discussed with the patient and he understands and is in agreement.     Flavio Phoenix DO  06/03/25  12:07 PM

## 2025-06-03 NOTE — DISCHARGE SUMMARY
Discharge Summary    Date: 6/4/2025  Patient Name: Fransisco Huddleston    YOB: 1955     Age: 69 y.o.    Admit Date: 5/30/2025  Discharge Date: 6/3/2025  Discharge Condition: Good    Admission Diagnosis  Altered mental status [R41.82];Neuroleptic malignant syndrome [G21.0]      Discharge Diagnosis  Principal Problem:    Altered mental status  Active Problems:    Moderate to severe pulmonary hypertension (HCC)    Paroxysmal A-fib (HCC)    Hypertensive heart disease with chronic diastolic congestive heart failure (HCC)    Hypothyroidism    Status post aortic valve replacement    S/P MVR (mitral valve repair)    Neuroleptic malignant syndrome    Toxic metabolic encephalopathy    Sleep deprivation    RLS (restless legs syndrome)    Sinus bradycardia    Vasovagal response    Acute metabolic encephalopathy  Resolved Problems:    * No resolved hospital problems. *      Hospital Stay  Narrative of Hospital Course:  Patient is a 6-year-old male who presented initially to the [place] ER for concern of worsening worse with leg syndrome and insomnia.  At outside facility was given doses of Ativan and subsequently Haldol.  After Haldol administration became fidgety, diaphoretic, tachycardic with dystonic symptoms.  Was initially placed on Precedex but had apneic episodes so was intubated for airway protection.  Concern at that time was for NMS.  Patient was subsequently transferred to [place] for ICU management.  Patient was evaluated by the neurology team.  Was started on bromocriptine.  Imaging studies were obtained and reviewed with no acute findings.  Patient did improve on treatment and was subsequently extubated.  Returned back to normal baseline mental status.  Initial plan was to discharge patient however became bradycardic while working with PT.  Was seen by the cardiology group, likely a vasovagal response.  Patient was observed for another 24 hours in the ICU without any recurrence of symptoms.  The

## 2025-06-03 NOTE — DISCHARGE INSTRUCTIONS
Please take all medications as prescribed. Please follow up with your primary care physician by calling today for the first available appointment. If you do not have a primary care physician, please contact a physician or clinic listed below today to establish care. Please return to the emergency department sooner if you develop uncontrolled fevers, uncontrolled  vomiting, or any other concerns.       Call [facility] [phone] to schedule delivery of your home concentrator as soon as you get home.

## 2025-06-03 NOTE — DISCHARGE INSTR - DIET

## 2025-06-03 NOTE — FLOWSHEET NOTE
Patient discharged home with wife with home O2 on at 2L NC, walker, and Holter monitor on. Discharge instruction provided. Pt and wife verbalized understanding, IV discontinued. Pt taken to car in wheelchair

## 2025-06-04 LAB
EKG ATRIAL RATE: 52 BPM
EKG P AXIS: 36 DEGREES
EKG P-R INTERVAL: 300 MS
EKG Q-T INTERVAL: 430 MS
EKG QRS DURATION: 96 MS
EKG QTC CALCULATION (BAZETT): 399 MS
EKG R AXIS: -4 DEGREES
EKG T AXIS: 20 DEGREES
EKG VENTRICULAR RATE: 52 BPM
MICROORGANISM SPEC CULT: NORMAL
SERVICE CMNT-IMP: NORMAL
SPECIMEN DESCRIPTION: NORMAL

## 2025-06-13 NOTE — CARE COORDINATION
Case Management Assessment  Initial Evaluation    Date/Time of Evaluation: 7/9/2024 10:09 AM  Assessment Completed by: Oralia Munoz    If patient is discharged prior to next notation, then this note serves as note for discharge by case management.    Patient Name: Fransisco Huddleston                   YOB: 1955  Diagnosis: Syncope and collapse [R55]  Bradycardia [R00.1]  Near syncope [R55]                   Date / Time: 7/8/2024 10:29 AM    Patient Admission Status: Observation   Readmission Risk (Low < 19, Mod (19-27), High > 27): No data recorded  Current PCP: Ritesh Brush MD  PCP verified by CM? Yes    Chart Reviewed: Yes      History Provided by: Patient  Patient Orientation: Alert and Oriented    Patient Cognition: Alert    Hospitalization in the last 30 days (Readmission):  No    If yes, Readmission Assessment in  Navigator will be completed.    Advance Directives:      Code Status: Full Code   Patient's Primary Decision Maker is: Legal Next of Kin (wife)      Discharge Planning:    Patient lives with: Spouse/Significant Other Type of Home: House  Primary Care Giver: Self  Patient Support Systems include: Spouse/Significant Other, Children   Current Financial resources: Medicare  Current community resources: None  Current services prior to admission: None            Current DME:              Type of Home Care services:  None    ADLS  Prior functional level: Independent in ADLs/IADLs  Current functional level: Independent in ADLs/IADLs    PT AM-PAC:   /24  OT AM-PAC:   /24    Family can provide assistance at DC:    Would you like Case Management to discuss the discharge plan with any other family members/significant others, and if so, who? Yes  Plans to Return to Present Housing: Yes  Other Identified Issues/Barriers to RETURNING to current housing: none  Potential Assistance needed at discharge:  (TBD)            Potential DME:    Patient expects to discharge to: House  Plan for  Last Refill: 04/03/25    Last OV: 06/12/25    Next OV:  07/24/25      Patient can receive medications requested due to appointment yesterday and upcoming in July.

## 2025-07-01 ENCOUNTER — HOSPITAL ENCOUNTER (OUTPATIENT)
Dept: SLEEP CENTER | Age: 70
Discharge: HOME | End: 2025-07-03
Payer: MEDICARE

## 2025-07-01 DIAGNOSIS — G25.81 RLS (RESTLESS LEGS SYNDROME): ICD-10-CM

## 2025-07-01 PROCEDURE — 95810 POLYSOM 6/> YRS 4/> PARAM: CPT

## 2025-07-02 VITALS
RESPIRATION RATE: 16 BRPM | BODY MASS INDEX: 32.02 KG/M2 | WEIGHT: 204 LBS | OXYGEN SATURATION: 96 % | HEIGHT: 67 IN | HEART RATE: 65 BPM

## 2025-07-02 ASSESSMENT — SLEEP AND FATIGUE QUESTIONNAIRES
HOW LIKELY ARE YOU TO NOD OFF OR FALL ASLEEP WHILE SITTING AND TALKING TO SOMEONE: SLIGHT CHANCE OF DOZING
HOW LIKELY ARE YOU TO NOD OFF OR FALL ASLEEP WHILE SITTING INACTIVE IN A PUBLIC PLACE: MODERATE CHANCE OF DOZING
ESS TOTAL SCORE: 15
HOW LIKELY ARE YOU TO NOD OFF OR FALL ASLEEP WHILE LYING DOWN TO REST IN THE AFTERNOON WHEN CIRCUMSTANCES PERMIT: SLIGHT CHANCE OF DOZING
HOW LIKELY ARE YOU TO NOD OFF OR FALL ASLEEP WHILE WATCHING TV: HIGH CHANCE OF DOZING
HOW LIKELY ARE YOU TO NOD OFF OR FALL ASLEEP IN A CAR, WHILE STOPPED FOR A FEW MINUTES IN TRAFFIC: SLIGHT CHANCE OF DOZING
HOW LIKELY ARE YOU TO NOD OFF OR FALL ASLEEP WHILE SITTING AND READING: HIGH CHANCE OF DOZING
HOW LIKELY ARE YOU TO NOD OFF OR FALL ASLEEP WHEN YOU ARE A PASSENGER IN A CAR FOR AN HOUR WITHOUT A BREAK: HIGH CHANCE OF DOZING
HOW LIKELY ARE YOU TO NOD OFF OR FALL ASLEEP WHILE SITTING QUIETLY AFTER LUNCH WITHOUT ALCOHOL: SLIGHT CHANCE OF DOZING

## 2025-07-13 PROBLEM — Z91.89 AT RISK FOR OBSTRUCTIVE SLEEP APNEA: Status: ACTIVE | Noted: 2025-07-13

## 2025-07-15 ENCOUNTER — OFFICE VISIT (OUTPATIENT)
Dept: NEUROLOGY | Age: 70
End: 2025-07-15
Payer: MEDICARE

## 2025-07-15 VITALS
BODY MASS INDEX: 29.26 KG/M2 | HEIGHT: 67 IN | DIASTOLIC BLOOD PRESSURE: 72 MMHG | WEIGHT: 186.4 LBS | HEART RATE: 71 BPM | SYSTOLIC BLOOD PRESSURE: 115 MMHG

## 2025-07-15 DIAGNOSIS — R20.0 NUMBNESS: Primary | ICD-10-CM

## 2025-07-15 PROCEDURE — 1123F ACP DISCUSS/DSCN MKR DOCD: CPT | Performed by: PSYCHIATRY & NEUROLOGY

## 2025-07-15 PROCEDURE — 3017F COLORECTAL CA SCREEN DOC REV: CPT | Performed by: PSYCHIATRY & NEUROLOGY

## 2025-07-15 PROCEDURE — 99214 OFFICE O/P EST MOD 30 MIN: CPT | Performed by: PSYCHIATRY & NEUROLOGY

## 2025-07-15 PROCEDURE — 4004F PT TOBACCO SCREEN RCVD TLK: CPT | Performed by: PSYCHIATRY & NEUROLOGY

## 2025-07-15 PROCEDURE — G8417 CALC BMI ABV UP PARAM F/U: HCPCS | Performed by: PSYCHIATRY & NEUROLOGY

## 2025-07-15 PROCEDURE — G8427 DOCREV CUR MEDS BY ELIG CLIN: HCPCS | Performed by: PSYCHIATRY & NEUROLOGY

## 2025-07-15 PROCEDURE — 1159F MED LIST DOCD IN RCRD: CPT | Performed by: PSYCHIATRY & NEUROLOGY

## 2025-07-15 RX ORDER — DOXEPIN HYDROCHLORIDE 25 MG/1
CAPSULE ORAL
COMMUNITY
Start: 2025-05-29

## 2025-07-15 RX ORDER — FAMOTIDINE 40 MG/1
TABLET, FILM COATED ORAL
COMMUNITY
Start: 2025-06-15

## 2025-07-15 RX ORDER — OMEPRAZOLE 40 MG/1
CAPSULE, DELAYED RELEASE ORAL
COMMUNITY
Start: 2025-06-15

## 2025-07-15 RX ORDER — ROPINIROLE 0.5 MG/1
TABLET, FILM COATED ORAL
COMMUNITY
Start: 2025-05-22

## 2025-07-15 RX ORDER — ZOLPIDEM TARTRATE 10 MG/1
TABLET ORAL
COMMUNITY
Start: 2025-06-26

## 2025-07-15 NOTE — PROGRESS NOTES
extremities; subjective paresthesias in lower limbs   Cerebellar No tremor; finger-to-nose and rapid alternating movements intact   Reflexes Symmetric 2+ throughout; plantar responses downgoing   Gait Not formally tested due to persistent sleep disruption and daytime fatigue   General examination: Normocephalic, atraumatic head with intact extraocular movements, unlabored respirations, normal ENT findings, a regular heart rate and rhythm, a non-tender abdomen without masses, no cyanosis or edema in the extremities with 2+ pulses, and intact skin of normal color.  Neurological work up:  Date Study Findings   5/29/2025 CT head Mild generalized cortical atrophy; patchy white matter changes consistent with chronic microvascular disease   -- CTA head/neck Not performed   5/30/2025 MRI brain Chronic microvascular ischemia; no acute infarction or mass   4/16/2025 2D Echo EF 55-60%, diastolic dysfunction, mild MR/TR, bioprosthetic AV, LA/RA dilation   -- MALORIE Not performed   5/31-6/1/2025 EEG (LT) Diffuse polymorphic slowing; no epileptiform discharges or seizures   5/31/2025 Labs CK peaked at 1853 with downtrend; Myoglobin elevated; LFTs mildly elevated; normal renal panel; ferritin 321, iron saturation 29%   Medication history:  Medication Status/Comment   Pramipexole Discontinued due to augmentation   Mirapex Discontinued due to inefficacy   Requip Discontinued; no relief   Bromocriptine Initiated inpatient; currently tapering   Gabapentin Trialed; discontinued due to cost   Gabapentin enacarbil Initiated outpatient; long-acting ?2? ligand   Diazepam, THC Trialed in the past without sustained benefit   Sinemet Previously tried; discontinued   Zolpidem Short-term as-needed trial initiated for sleep initiation   Migraine/seizure/Parkinson's/dementia history: Not applicable. No history of seizures, migraines, parkinsonism, or cognitive decline.  Assessment and Recommendations:  Restless Legs Syndrome - G25.81  Mr. Hay

## 2025-07-18 ENCOUNTER — TELEPHONE (OUTPATIENT)
Dept: NEUROLOGY | Age: 70
End: 2025-07-18

## 2025-07-21 NOTE — TELEPHONE ENCOUNTER
Medication approved.    Approval Details    Authorized from July 16, 2025 to December 31, 2099    Pt informed via [employer].

## 2025-08-01 ENCOUNTER — TELEPHONE (OUTPATIENT)
Dept: NEUROLOGY | Age: 70
End: 2025-08-01

## 2025-08-01 DIAGNOSIS — G25.81 RLS (RESTLESS LEGS SYNDROME): Primary | ICD-10-CM

## 2025-08-01 DIAGNOSIS — G47.33 OBSTRUCTIVE SLEEP APNEA (ADULT) (PEDIATRIC): ICD-10-CM

## 2025-08-06 ENCOUNTER — HOSPITAL ENCOUNTER (OUTPATIENT)
Dept: SLEEP CENTER | Age: 70
Discharge: HOME | End: 2025-08-08
Payer: MEDICARE

## 2025-08-06 VITALS — HEIGHT: 67 IN | BODY MASS INDEX: 30.13 KG/M2 | WEIGHT: 192 LBS

## 2025-08-06 PROCEDURE — 95811 POLYSOM 6/>YRS CPAP 4/> PARM: CPT

## 2025-08-24 PROBLEM — G47.33 OBSTRUCTIVE SLEEP APNEA SYNDROME: Status: ACTIVE | Noted: 2025-08-24

## 2025-09-03 RX ORDER — GABAPENTIN 300 MG/1
300 CAPSULE ORAL NIGHTLY
Qty: 30 CAPSULE | Refills: 3 | Status: SHIPPED | OUTPATIENT
Start: 2025-09-03 | End: 2027-09-10